# Patient Record
Sex: FEMALE | Race: WHITE | NOT HISPANIC OR LATINO | URBAN - METROPOLITAN AREA
[De-identification: names, ages, dates, MRNs, and addresses within clinical notes are randomized per-mention and may not be internally consistent; named-entity substitution may affect disease eponyms.]

---

## 2019-03-29 ENCOUNTER — EMERGENCY (EMERGENCY)
Facility: HOSPITAL | Age: 39
LOS: 1 days | End: 2019-03-29
Attending: EMERGENCY MEDICINE
Payer: COMMERCIAL

## 2019-03-29 VITALS
TEMPERATURE: 98 F | HEIGHT: 67 IN | SYSTOLIC BLOOD PRESSURE: 124 MMHG | HEART RATE: 111 BPM | OXYGEN SATURATION: 100 % | DIASTOLIC BLOOD PRESSURE: 83 MMHG | RESPIRATION RATE: 18 BRPM | WEIGHT: 160.06 LBS

## 2019-03-29 DIAGNOSIS — O03.9 COMPLETE OR UNSPECIFIED SPONTANEOUS ABORTION WITHOUT COMPLICATION: Chronic | ICD-10-CM

## 2019-03-29 LAB
ALBUMIN SERPL ELPH-MCNC: 4.4 G/DL — SIGNIFICANT CHANGE UP (ref 3.3–5)
ALP SERPL-CCNC: 99 U/L — SIGNIFICANT CHANGE UP (ref 40–120)
ALT FLD-CCNC: 185 U/L — HIGH (ref 10–45)
ANION GAP SERPL CALC-SCNC: 16 MMOL/L — SIGNIFICANT CHANGE UP (ref 5–17)
APPEARANCE UR: CLEAR — SIGNIFICANT CHANGE UP
AST SERPL-CCNC: 77 U/L — HIGH (ref 10–40)
BASOPHILS # BLD AUTO: 0 K/UL — SIGNIFICANT CHANGE UP (ref 0–0.2)
BASOPHILS NFR BLD AUTO: 0.1 % — SIGNIFICANT CHANGE UP (ref 0–2)
BILIRUB SERPL-MCNC: 0.4 MG/DL — SIGNIFICANT CHANGE UP (ref 0.2–1.2)
BILIRUB UR-MCNC: NEGATIVE — SIGNIFICANT CHANGE UP
BUN SERPL-MCNC: 8 MG/DL — SIGNIFICANT CHANGE UP (ref 7–23)
CALCIUM SERPL-MCNC: 9.6 MG/DL — SIGNIFICANT CHANGE UP (ref 8.4–10.5)
CHLORIDE SERPL-SCNC: 91 MMOL/L — LOW (ref 96–108)
CO2 SERPL-SCNC: 23 MMOL/L — SIGNIFICANT CHANGE UP (ref 22–31)
COLOR SPEC: COLORLESS — SIGNIFICANT CHANGE UP
CREAT SERPL-MCNC: 0.74 MG/DL — SIGNIFICANT CHANGE UP (ref 0.5–1.3)
DIFF PNL FLD: NEGATIVE — SIGNIFICANT CHANGE UP
EOSINOPHIL # BLD AUTO: 0 K/UL — SIGNIFICANT CHANGE UP (ref 0–0.5)
EOSINOPHIL NFR BLD AUTO: 0.2 % — SIGNIFICANT CHANGE UP (ref 0–6)
GAS PNL BLDV: SIGNIFICANT CHANGE UP
GAS PNL BLDV: SIGNIFICANT CHANGE UP
GLUCOSE SERPL-MCNC: 122 MG/DL — HIGH (ref 70–99)
GLUCOSE UR QL: NEGATIVE — SIGNIFICANT CHANGE UP
HCT VFR BLD CALC: 38.4 % — SIGNIFICANT CHANGE UP (ref 34.5–45)
HGB BLD-MCNC: 13 G/DL — SIGNIFICANT CHANGE UP (ref 11.5–15.5)
KETONES UR-MCNC: NEGATIVE — SIGNIFICANT CHANGE UP
LEUKOCYTE ESTERASE UR-ACNC: NEGATIVE — SIGNIFICANT CHANGE UP
LYMPHOCYTES # BLD AUTO: 1.5 K/UL — SIGNIFICANT CHANGE UP (ref 1–3.3)
LYMPHOCYTES # BLD AUTO: 7.4 % — LOW (ref 13–44)
MCHC RBC-ENTMCNC: 27.7 PG — SIGNIFICANT CHANGE UP (ref 27–34)
MCHC RBC-ENTMCNC: 33.9 GM/DL — SIGNIFICANT CHANGE UP (ref 32–36)
MCV RBC AUTO: 81.5 FL — SIGNIFICANT CHANGE UP (ref 80–100)
MONOCYTES # BLD AUTO: 1.5 K/UL — HIGH (ref 0–0.9)
MONOCYTES NFR BLD AUTO: 7.4 % — SIGNIFICANT CHANGE UP (ref 2–14)
NEUTROPHILS # BLD AUTO: 17.2 K/UL — HIGH (ref 1.8–7.4)
NEUTROPHILS NFR BLD AUTO: 84.9 % — HIGH (ref 43–77)
NITRITE UR-MCNC: NEGATIVE — SIGNIFICANT CHANGE UP
PH UR: 6.5 — SIGNIFICANT CHANGE UP (ref 5–8)
PLATELET # BLD AUTO: 251 K/UL — SIGNIFICANT CHANGE UP (ref 150–400)
POTASSIUM SERPL-MCNC: 3.7 MMOL/L — SIGNIFICANT CHANGE UP (ref 3.5–5.3)
POTASSIUM SERPL-SCNC: 3.7 MMOL/L — SIGNIFICANT CHANGE UP (ref 3.5–5.3)
PROT SERPL-MCNC: 8.1 G/DL — SIGNIFICANT CHANGE UP (ref 6–8.3)
PROT UR-MCNC: NEGATIVE — SIGNIFICANT CHANGE UP
RBC # BLD: 4.71 M/UL — SIGNIFICANT CHANGE UP (ref 3.8–5.2)
RBC # FLD: 14.7 % — HIGH (ref 10.3–14.5)
SODIUM SERPL-SCNC: 130 MMOL/L — LOW (ref 135–145)
SP GR SPEC: 1 — LOW (ref 1.01–1.02)
UROBILINOGEN FLD QL: NEGATIVE — SIGNIFICANT CHANGE UP
WBC # BLD: 20.3 K/UL — HIGH (ref 3.8–10.5)
WBC # FLD AUTO: 20.3 K/UL — HIGH (ref 3.8–10.5)

## 2019-03-29 PROCEDURE — 99220: CPT

## 2019-03-29 PROCEDURE — 74177 CT ABD & PELVIS W/CONTRAST: CPT | Mod: 26

## 2019-03-29 RX ORDER — CEFTRIAXONE 500 MG/1
1 INJECTION, POWDER, FOR SOLUTION INTRAMUSCULAR; INTRAVENOUS EVERY 12 HOURS
Qty: 0 | Refills: 0 | Status: DISCONTINUED | OUTPATIENT
Start: 2019-03-29 | End: 2019-04-02

## 2019-03-29 RX ORDER — KETOROLAC TROMETHAMINE 30 MG/ML
15 SYRINGE (ML) INJECTION ONCE
Qty: 0 | Refills: 0 | Status: DISCONTINUED | OUTPATIENT
Start: 2019-03-29 | End: 2019-03-29

## 2019-03-29 RX ORDER — SODIUM CHLORIDE 9 MG/ML
1000 INJECTION INTRAMUSCULAR; INTRAVENOUS; SUBCUTANEOUS ONCE
Qty: 0 | Refills: 0 | Status: COMPLETED | OUTPATIENT
Start: 2019-03-29 | End: 2019-03-29

## 2019-03-29 RX ORDER — KETOROLAC TROMETHAMINE 30 MG/ML
15 SYRINGE (ML) INJECTION EVERY 6 HOURS
Qty: 0 | Refills: 0 | Status: DISCONTINUED | OUTPATIENT
Start: 2019-03-29 | End: 2019-03-29

## 2019-03-29 RX ORDER — CEFOTETAN DISODIUM 1 G
2 VIAL (EA) INJECTION ONCE
Qty: 0 | Refills: 0 | Status: COMPLETED | OUTPATIENT
Start: 2019-03-29 | End: 2019-03-29

## 2019-03-29 RX ORDER — ACETAMINOPHEN 500 MG
975 TABLET ORAL ONCE
Qty: 0 | Refills: 0 | Status: DISCONTINUED | OUTPATIENT
Start: 2019-03-29 | End: 2019-03-29

## 2019-03-29 RX ADMIN — Medication 15 MILLIGRAM(S): at 22:29

## 2019-03-29 RX ADMIN — SODIUM CHLORIDE 2000 MILLILITER(S): 9 INJECTION INTRAMUSCULAR; INTRAVENOUS; SUBCUTANEOUS at 16:52

## 2019-03-29 RX ADMIN — Medication 15 MILLIGRAM(S): at 16:53

## 2019-03-29 RX ADMIN — Medication 100 GRAM(S): at 18:57

## 2019-03-29 RX ADMIN — SODIUM CHLORIDE 2000 MILLILITER(S): 9 INJECTION INTRAMUSCULAR; INTRAVENOUS; SUBCUTANEOUS at 18:57

## 2019-03-29 NOTE — ED ADULT NURSE NOTE - NSIMPLEMENTINTERV_GEN_ALL_ED
Implemented All Universal Safety Interventions:  [place] to call system. Call bell, personal items and telephone within reach. Instruct patient to call for assistance. Room bathroom lighting operational. Non-slip footwear when patient is off stretcher. Physically safe environment: no spills, clutter or unnecessary equipment. Stretcher in lowest position, wheels locked, appropriate side rails in place.

## 2019-03-29 NOTE — ED PROVIDER NOTE - PROGRESS NOTE DETAILS
Initially were pursuing CT no contrast to eval stone in setting of possible clinical pyelo, however urine unremarkable for infection or stone, so d/w attending who advised doing CTAP w/ oral contrast now to further evaluate. Pt aware and agreeable. Pt reports improved pain s/p toradol. - Xu Verdin PA-C Given WBC CT switched to CTAP w/ oral and IV. Zika panel (serum and urine) to be sent to lab per attending. Pt aware. - Xu Verdin PA-C Dr. Shen: Results of CTAP appreciated. Findings are most c/w pyleo given fever, leukocytosis, ureteral involvement. Low suspicion for infarct. Will dispo pt to CDU for IV hydration, IV abx, pain control. Given WBC CT switched to CTAP w/ oral and IV. Zika panel (serum and urine) to be sent to lab per attending. Pt aware. Pt does endorse she just finished short steroid taper course within the last 2 days for her migraine HA. attending aware. - Xu Verdin PA-C

## 2019-03-29 NOTE — ED PROVIDER NOTE - ATTENDING CONTRIBUTION TO CARE
38y F here with R flank pain and RLQ abd pain. Onset 2 days ago. 9/10 severity stabbing in quality. Today pain has improved slightly. No palliative or provocative factors. Pain was assoc with NV, none today. Pt had documented fever to Tmax 101F. No urinary sx. No diarrhea. Pt with recent travel to [place], home 10 days ago. Seen by GI Dr Bautista this AM and sent to ED for eval, no blood or urine.   Gen: WNWD uncomfortable appearing   HEENT: NCAT PERRL EOMI normal pharynx  Neck: supple  CV: RRR, no murmur  Lung: CTA BL  Abd: +BS soft ND TTP RLQ and R CVAT no grr  Ext: wwp, palp pulses, FROMx4, no cce  Neuro: CN grossly intact, sensation intact, motor 5/5 throughout  AP: 38y F here with R flank pain and RLQ abd pain x2 days assoc w fever and NV. Ddx includes but is not limited to: acute pyelo/UTI, appy, ovarian cyst, ectopic pregnancy. Will check labs, UA, CTAP. 38y F here with R flank pain and RLQ abd pain. Onset 2 days ago. 9/10 severity stabbing in quality. Today abd pain has improved slightly however back pain worsening. No provocative factors. Back pain feels better with pressure applied to area. Pain was assoc with NV, none today. Pt had documented fever to Tmax 101F. No urinary sx. No diarrhea. No vaginal DC. LMP last week. Pt with recent travel to [place], home 10 days ago. Seen by GI Dr Bautista this AM and sent to ED for eval, no blood or urine testing at office.   Gen: WNWD uncomfortable appearing   HEENT: NCAT PERRL EOMI normal pharynx dry mm  Neck: supple  CV: RRR, no murmur  Lung: CTA BL  Abd: +BS soft ND TTP RLQ and R CVAT no guarding, referred pain RLQ   Skin: warm dry intact, no rashes   Ext: wwp, palp pulses, FROMx4, no cce  Neuro: CN grossly intact, sensation intact, motor 5/5 throughout  AP: 38y F here with R flank pain and RLQ abd pain x2 days assoc w fever and NV. Ddx includes but is not limited to: acute pyelo/UTI, appy, ovarian cyst, ectopic pregnancy. Will check labs, UA, CTAP. 38y F here with R flank pain and RLQ abd pain. Onset 2 days ago. 9/10 severity stabbing in quality. Today abd pain has improved slightly however back pain worsening. No provocative factors. Back pain feels better with pressure applied to area. Pain was assoc with NV, none today. Pt had documented fever to Tmax 101F. No urinary sx. No diarrhea. No vaginal DC. LMP last week. Pt with recent travel to [place], home 10 days ago. Seen by GI Dr Bautista this AM and sent to ED for eval, no blood or urine testing at office. Of note pt was on steroids for possible sinusitis/migraine, last dose was 4 days ago.   Gen: WNWD uncomfortable appearing   HEENT: NCAT PERRL EOMI normal pharynx dry mm  Neck: supple  CV: RRR, no murmur  Lung: CTA BL  Abd: +BS soft ND TTP RLQ and R CVAT no guarding, referred pain RLQ   Skin: warm dry intact, no rashes   Ext: wwp, palp pulses, FROMx4, no cce  Neuro: CN grossly intact, sensation intact, motor 5/5 throughout  AP: 38y F here with R flank pain and RLQ abd pain x2 days assoc w fever and NV. Ddx includes but is not limited to: acute pyelo/UTI, appy, ovarian cyst, ectopic pregnancy. Will check labs, UA, CTAP.

## 2019-03-29 NOTE — ED PROVIDER NOTE - MUSCULOSKELETAL, MLM
+right CVA tenderness. No evidence of rash. Spine appears normal, range of motion is not limited, no muscle or joint tenderness

## 2019-03-29 NOTE — ED PROVIDER NOTE - OBJECTIVE STATEMENT
37 yo female PMHx PCOS, 37 yo female PMHx PCOS, migraine headaches presents to the ED c/o right sided flank pain and right lower quadrant abdominal pain x 2 days. Pt states symptoms initially started with lower abdominal pain when she was on table for MRI (outpatient neuro workup for migraine headaches), pain was initially 5/10, stabbing in quality no worse w/ movement. Since then, abdominal pain has improved and she's began to develop more R flank pain with fever/chills (tmax 101 yesterday), additionally had nausea and several episodes of nb/nb vomiting. Mild decreased appetite. Recently returned from [facility] 10 days. Went to GI Dr. Lott this AM regarding symptoms, sent pt to ED. Denies recent sick contacts, urinary urgency, dysuria, frequency, chest pain, shortness of breath, hematuria, vaginal bleeding/discharge, diarrhea, dizziness/light-headedness, numbness/tingling, rash, bite wounds, joint pain. 39 yo female PMHx PCOS, migraine headaches presents to the ED c/o right sided flank pain and right lower quadrant abdominal pain x 2 days. Pt states symptoms initially started with lower abdominal pain when she was on table for MRI (outpatient neuro workup for migraine headaches,), pain was initially 5/10, stabbing in quality no worse w/ movement. Since then, abdominal pain has improved and she's began to develop more R flank pain with fever/chills (tmax 101 yesterday), additionally had nausea and several episodes of nb/nb vomiting. Mild decreased appetite. Recently returned from [place] 10 days. Of note pt just finished steroid taper for her migraine headaches prescribed by her neurologist. Went to GI Dr. Lott this AM regarding symptoms, sent pt to ED. Denies recent sick contacts, urinary urgency, dysuria, frequency, chest pain, shortness of breath, hematuria, vaginal bleeding/discharge, diarrhea, dizziness/light-headedness, numbness/tingling, rash, bite wounds, joint pain.

## 2019-03-29 NOTE — ED ADULT NURSE NOTE - OBJECTIVE STATEMENT
38 yr old female (hx PCOS on metformin, migraine, fetal demise x 2 yrs ago, D&E, post partem pre eclampsia treated with metoprolol) sent by gastro for eval of R flank pain, c/o R flank/back pain x 2 days, nausea, vomited twice over last 2 days, temp: 100.1 *F, returned from [place] on march 18, since return home, pt c/o photosensitivity, migraine, has had MRI head, completed dose of steroids, now headache resolved, +persistent photosensitivity, at present +R CVA tenderness, abd soft nontender, clear lungs, LMP:  2 days ago, denies hematuria, +also admits to constipation  x2 days, "small bowel movement yesterday", multiple family members present

## 2019-03-29 NOTE — ED PROVIDER NOTE - ABDOMINAL TENDER
Abdomen is soft, ttp RLQ without rebound, guarding or rigidity. +Rovsing's sign and ttp over McBurney's point. Negative Salas's. +right CVA tenderness./right lower quadrant/right costovertebral angle

## 2019-03-30 VITALS
OXYGEN SATURATION: 98 % | DIASTOLIC BLOOD PRESSURE: 79 MMHG | SYSTOLIC BLOOD PRESSURE: 120 MMHG | RESPIRATION RATE: 18 BRPM | HEART RATE: 88 BPM | TEMPERATURE: 99 F

## 2019-03-30 LAB
ALBUMIN SERPL ELPH-MCNC: 3.3 G/DL — SIGNIFICANT CHANGE UP (ref 3.3–5)
ALP SERPL-CCNC: 79 U/L — SIGNIFICANT CHANGE UP (ref 40–120)
ALT FLD-CCNC: 108 U/L — HIGH (ref 10–45)
ANION GAP SERPL CALC-SCNC: 11 MMOL/L — SIGNIFICANT CHANGE UP (ref 5–17)
AST SERPL-CCNC: 37 U/L — SIGNIFICANT CHANGE UP (ref 10–40)
BASOPHILS # BLD AUTO: 0 K/UL — SIGNIFICANT CHANGE UP (ref 0–0.2)
BASOPHILS NFR BLD AUTO: 0.1 % — SIGNIFICANT CHANGE UP (ref 0–2)
BILIRUB SERPL-MCNC: 0.3 MG/DL — SIGNIFICANT CHANGE UP (ref 0.2–1.2)
BUN SERPL-MCNC: 7 MG/DL — SIGNIFICANT CHANGE UP (ref 7–23)
CALCIUM SERPL-MCNC: 8.5 MG/DL — SIGNIFICANT CHANGE UP (ref 8.4–10.5)
CHLORIDE SERPL-SCNC: 105 MMOL/L — SIGNIFICANT CHANGE UP (ref 96–108)
CO2 SERPL-SCNC: 23 MMOL/L — SIGNIFICANT CHANGE UP (ref 22–31)
CREAT SERPL-MCNC: 0.88 MG/DL — SIGNIFICANT CHANGE UP (ref 0.5–1.3)
CULTURE RESULTS: SIGNIFICANT CHANGE UP
EOSINOPHIL # BLD AUTO: 0 K/UL — SIGNIFICANT CHANGE UP (ref 0–0.5)
EOSINOPHIL NFR BLD AUTO: 0.3 % — SIGNIFICANT CHANGE UP (ref 0–6)
GLUCOSE SERPL-MCNC: 101 MG/DL — HIGH (ref 70–99)
HCT VFR BLD CALC: 31 % — LOW (ref 34.5–45)
HGB BLD-MCNC: 10.2 G/DL — LOW (ref 11.5–15.5)
LYMPHOCYTES # BLD AUTO: 1.3 K/UL — SIGNIFICANT CHANGE UP (ref 1–3.3)
LYMPHOCYTES # BLD AUTO: 9.5 % — LOW (ref 13–44)
MCHC RBC-ENTMCNC: 26.9 PG — LOW (ref 27–34)
MCHC RBC-ENTMCNC: 33 GM/DL — SIGNIFICANT CHANGE UP (ref 32–36)
MCV RBC AUTO: 81.6 FL — SIGNIFICANT CHANGE UP (ref 80–100)
MONOCYTES # BLD AUTO: 1.2 K/UL — HIGH (ref 0–0.9)
MONOCYTES NFR BLD AUTO: 9 % — SIGNIFICANT CHANGE UP (ref 2–14)
NEUTROPHILS # BLD AUTO: 11.1 K/UL — HIGH (ref 1.8–7.4)
NEUTROPHILS NFR BLD AUTO: 81.1 % — HIGH (ref 43–77)
PLATELET # BLD AUTO: 199 K/UL — SIGNIFICANT CHANGE UP (ref 150–400)
POTASSIUM SERPL-MCNC: 4 MMOL/L — SIGNIFICANT CHANGE UP (ref 3.5–5.3)
POTASSIUM SERPL-SCNC: 4 MMOL/L — SIGNIFICANT CHANGE UP (ref 3.5–5.3)
PROT SERPL-MCNC: 6.1 G/DL — SIGNIFICANT CHANGE UP (ref 6–8.3)
RBC # BLD: 3.8 M/UL — SIGNIFICANT CHANGE UP (ref 3.8–5.2)
RBC # FLD: 14.6 % — HIGH (ref 10.3–14.5)
SODIUM SERPL-SCNC: 139 MMOL/L — SIGNIFICANT CHANGE UP (ref 135–145)
SPECIMEN SOURCE: SIGNIFICANT CHANGE UP
WBC # BLD: 13.7 K/UL — HIGH (ref 3.8–10.5)
WBC # FLD AUTO: 13.7 K/UL — HIGH (ref 3.8–10.5)

## 2019-03-30 PROCEDURE — 87662 ZIKA VIRUS DNA/RNA AMP PROBE: CPT

## 2019-03-30 PROCEDURE — 85027 COMPLETE CBC AUTOMATED: CPT

## 2019-03-30 PROCEDURE — 82435 ASSAY OF BLOOD CHLORIDE: CPT

## 2019-03-30 PROCEDURE — 82330 ASSAY OF CALCIUM: CPT

## 2019-03-30 PROCEDURE — 96375 TX/PRO/DX INJ NEW DRUG ADDON: CPT

## 2019-03-30 PROCEDURE — 82803 BLOOD GASES ANY COMBINATION: CPT

## 2019-03-30 PROCEDURE — 83605 ASSAY OF LACTIC ACID: CPT

## 2019-03-30 PROCEDURE — 80053 COMPREHEN METABOLIC PANEL: CPT

## 2019-03-30 PROCEDURE — 81001 URINALYSIS AUTO W/SCOPE: CPT

## 2019-03-30 PROCEDURE — 82947 ASSAY GLUCOSE BLOOD QUANT: CPT

## 2019-03-30 PROCEDURE — 87086 URINE CULTURE/COLONY COUNT: CPT

## 2019-03-30 PROCEDURE — 74177 CT ABD & PELVIS W/CONTRAST: CPT

## 2019-03-30 PROCEDURE — 99284 EMERGENCY DEPT VISIT MOD MDM: CPT | Mod: 25

## 2019-03-30 PROCEDURE — 96374 THER/PROPH/DIAG INJ IV PUSH: CPT | Mod: XU

## 2019-03-30 PROCEDURE — 84295 ASSAY OF SERUM SODIUM: CPT

## 2019-03-30 PROCEDURE — 85014 HEMATOCRIT: CPT

## 2019-03-30 PROCEDURE — 84132 ASSAY OF SERUM POTASSIUM: CPT

## 2019-03-30 PROCEDURE — 96376 TX/PRO/DX INJ SAME DRUG ADON: CPT

## 2019-03-30 PROCEDURE — G0378: CPT

## 2019-03-30 PROCEDURE — 99217: CPT

## 2019-03-30 RX ORDER — SODIUM CHLORIDE 9 MG/ML
1000 INJECTION INTRAMUSCULAR; INTRAVENOUS; SUBCUTANEOUS ONCE
Qty: 0 | Refills: 0 | Status: COMPLETED | OUTPATIENT
Start: 2019-03-30 | End: 2019-03-30

## 2019-03-30 RX ORDER — ACETAMINOPHEN 500 MG
975 TABLET ORAL ONCE
Qty: 0 | Refills: 0 | Status: COMPLETED | OUTPATIENT
Start: 2019-03-30 | End: 2019-03-30

## 2019-03-30 RX ORDER — FLUCONAZOLE 150 MG/1
1 TABLET ORAL
Qty: 1 | Refills: 0
Start: 2019-03-30

## 2019-03-30 RX ORDER — CEFPODOXIME PROXETIL 100 MG
1 TABLET ORAL
Qty: 20 | Refills: 0
Start: 2019-03-30 | End: 2019-04-08

## 2019-03-30 RX ADMIN — CEFTRIAXONE 100 GRAM(S): 500 INJECTION, POWDER, FOR SOLUTION INTRAMUSCULAR; INTRAVENOUS at 06:14

## 2019-03-30 RX ADMIN — Medication 975 MILLIGRAM(S): at 04:33

## 2019-03-30 RX ADMIN — SODIUM CHLORIDE 1000 MILLILITER(S): 9 INJECTION INTRAMUSCULAR; INTRAVENOUS; SUBCUTANEOUS at 04:33

## 2019-03-30 RX ADMIN — Medication 975 MILLIGRAM(S): at 05:12

## 2019-03-30 NOTE — ED CDU PROVIDER SUBSEQUENT DAY NOTE - MEDICAL DECISION MAKING DETAILS
Pt with flank pain and fever no vomiting, abdomen soft non tender no masses , IV antibiotcs re eval --Jimenez improving in CDU

## 2019-03-30 NOTE — ED ADULT NURSE REASSESSMENT NOTE - NS ED NURSE REASSESS COMMENT FT1
15.30  pt is reviewed by Dr Tony SANCHEZ MD pt is discharged  . ML out BOO Hardy explained the follow up care & gave the discharge summary. Pt verbalized the understanding on follow up care pt stable to go home  Pt has stable vitals steady gait at the time of discharge
Patient report received from dayshift RN. Patient is a/ox3, reports improvement in back/abdomen pain, 3/10. Patient pending CT at this time. No acute distress, no n/v/d. Patient well appearing, VSS, afebrile. Family at bedside.
drinking CT contrast, family members present
Pt received from PABLO Rose. Pt oriented to CDU & plan of care was discussed. Pt states she has 4/10 pain to R flank and no R abdominal pain unless palpated. Pt does not want any pain meds at this time. Pt denies any urinary symptoms. Safety & comfort measures maintained. Call bell in reach. Will continue to monitor.
07.00 Am  Pt received from PABLO Banerjee. Pt is observed for   Pyelonephritis   08.00Am Pt is reassessed Pt is AO4. Pt denies N/V/D fever chills CP SOB headache dizziness  .pt is pain free Safety & comfort measures maintained. Call bell in reach. IV site looks clean & dry & no signs of infiltration noted.  Will continue to monitor

## 2019-03-30 NOTE — ED CDU PROVIDER INITIAL DAY NOTE - ATTENDING CONTRIBUTION TO CARE
38y F hx of PCOS, recent dx of migraine HAs presented to ED with c/o R sided lower abd pain and R flank pain x2 days assoc with nausea, vomiting, fever, chills. Denies urinary sx. Was recently on steroids. + Recent travel to [facility]. Labs in ED show leukocytosis, UA unremarkable, however CT shows areas of decreased attenuation in the right kidney lower pole with significant perinephric inflammation. Nonspecific urothelial enhancement of the right ureter. Findings suspicious for acute pyelonephritis, ucx pending, pt initiated on IV abx. Dispo to CDU for IV abx, fever/pain control and monitoring.

## 2019-03-30 NOTE — ED CDU PROVIDER SUBSEQUENT DAY NOTE - HISTORY
No interval changes since initial CDU provider note. Pt feels well without complaint. states abd/flank pain are improving. NAD, VSS. will continue IV ceftriaxone and monitoring. Bobby Titus

## 2019-03-30 NOTE — ED CDU PROVIDER DISPOSITION NOTE - ATTENDING CONTRIBUTION TO CARE
I have seen and evaluated this patient with the [place] practice clinician.   I agree with the findings  unless other wise stated. I have amended notes where needed.  After my face to face bedside evaluation, I am noting: patient responded well to ceftriaxone.  Flank pain improved.  Patient remained afebrile throughout the day.  Patient medically stable for discharge home with cefpodoxime and close outpatient follow up.

## 2019-03-30 NOTE — ED CDU PROVIDER DISPOSITION NOTE - NSFOLLOWUPINSTRUCTIONS_ED_ALL_ED_FT
1. Stay well hydrated  2.  Take cefpodoxime as prescribed.   Take Motrin 600mg every 6-8 hours with food if needed for pain. Take Tylenol 650mg every 6 hours as needed for fever/pain.   2.  Follow up with your PMD within 48-72 hours.  3. Worsening pain, persistent fevers, chills, nausea, vomiting return to ER

## 2019-03-30 NOTE — ED CDU PROVIDER DISPOSITION NOTE - PLAN OF CARE
1. Take _________ as prescribed.  Increase fluids. Take Motrin 600mg every 8 hours with food if needed for pain. Take Tylenol 650mg every 6-8 hours as needed for fever/pain.   2.  Follow up with your PMD within 48-72 hours.  3. Worsening pain, new fever, chills, nausea, vomiting return to ER 1. Stay well hydrated  2.  Take cefpodoxime as prescribed.   Take Motrin 600mg every 6-8 hours with food if needed for pain. Take Tylenol 650mg every 6 hours as needed for fever/pain.   2.  Follow up with your PMD within 48-72 hours.  3. Worsening pain, persistent fevers, chills, nausea, vomiting return to ER

## 2019-03-30 NOTE — ED CDU PROVIDER SUBSEQUENT DAY NOTE - ATTENDING CONTRIBUTION TO CARE
I have seen and evaluated this patient with the advance practice clinician.   I agree with the findings  unless other wise stated. I have amended notes where needed.  After my face to face bedside evaluation, I am noting: Pt with flank pain and fever no vomiting, abdomen soft non tender no masses , IV antibiotcs re eval --Jimenez improving in CDU

## 2019-03-30 NOTE — ED CDU PROVIDER SUBSEQUENT DAY NOTE - PROGRESS NOTE DETAILS
CDU NOTE BOO NASH: Pt resting comfortably, feeling well; states abd/flank pain decreased to 2/10. NAD, VSS aside from temp 101.8. abd: flat/ND/NT/soft, + R CVAT. will give IVF and tylenol for fever. will continue monitoring. Patient seen at bedside in NAD.  VSS.  Patient resting comfortably without complaints. Patient reports that her pain has improved.  Last fever 101.8 at 430am.  Currently afebrile.  -Gadiel Hardy PA-C Patient seen at bedside in NAD.  VSS.  Patient resting comfortably without complaints. Patient continues to feel better.  No fevers/chills. Right flank pain has significantly improved.  Tolerating PO.  Patient given strict return precautions and close outpatient follow up encouraged.  -Gadiel Hardy PA-C

## 2019-03-30 NOTE — ED CDU PROVIDER INITIAL DAY NOTE - OBJECTIVE STATEMENT
37 y/o female with PMHx PCOS and migraine headaches presents to the ED c/o right sided flank pain and right lower quadrant abdominal pain x 2 days. Pt states symptoms initially started with lower abdominal pain when she was on table for MRI (outpatient neuro workup for migraine headaches,), pain was initially 5/10, stabbing in quality no worse w/ movement. Since then, abdominal pain has improved but she developed more R flank pain with fever/chills (tmax 100.1 today), additionally had nausea and 2 episodes of nb/nb vomiting yesterday. Mild decreased appetite. Recently returned from [place] 10 days. Of note pt just finished steroid taper for dizziness 2/2 presumed inner ear viral infx by her ENT. Went to GI Dr. Lott this AM regarding symptoms, sent pt to ED. Denies recent sick contacts, urinary urgency, dysuria, frequency, chest pain, shortness of breath, hematuria, vaginal bleeding/discharge, diarrhea, dizziness/light-headedness, numbness/tingling, rash, bite wounds, joint pain.  In ED, WBC 20.3, Na 130, AST 77, , lactate 3.1, UA negative, CT a/p showed decreased attenuation of R renal cortex with perinephric stranding. pt given IVFs, repeat lactate 1.0. pt started on IV cefotetan and sent to CDU for continued IV antibiotics (ceftriaxone).     PMD Dr. Denise Milton (not aff.)

## 2019-04-01 LAB
ZIKA VIRUS PCR SERUM: SIGNIFICANT CHANGE UP
ZIKA VIRUS PCR URINE: SIGNIFICANT CHANGE UP
ZIKV RNA SERPL QL NAA+PROBE: SIGNIFICANT CHANGE UP
ZIKV RNA UR QL NAA+PROBE: SIGNIFICANT CHANGE UP

## 2020-10-09 PROBLEM — G43.909 MIGRAINE, UNSPECIFIED, NOT INTRACTABLE, WITHOUT STATUS MIGRAINOSUS: Chronic | Status: ACTIVE | Noted: 2019-03-29

## 2020-10-09 PROBLEM — E28.2 POLYCYSTIC OVARIAN SYNDROME: Chronic | Status: ACTIVE | Noted: 2019-03-29

## 2020-11-03 ENCOUNTER — APPOINTMENT (OUTPATIENT)
Dept: INTERNAL MEDICINE | Facility: CLINIC | Age: 40
End: 2020-11-03
Payer: COMMERCIAL

## 2020-11-03 VITALS
OXYGEN SATURATION: 100 % | HEART RATE: 99 BPM | DIASTOLIC BLOOD PRESSURE: 82 MMHG | HEIGHT: 66 IN | WEIGHT: 176 LBS | BODY MASS INDEX: 28.28 KG/M2 | SYSTOLIC BLOOD PRESSURE: 126 MMHG | TEMPERATURE: 97.52 F

## 2020-11-03 DIAGNOSIS — Z82.49 FAMILY HISTORY OF ISCHEMIC HEART DISEASE AND OTHER DISEASES OF THE CIRCULATORY SYSTEM: ICD-10-CM

## 2020-11-03 DIAGNOSIS — E28.2 POLYCYSTIC OVARIAN SYNDROME: ICD-10-CM

## 2020-11-03 DIAGNOSIS — Z86.69 PERSONAL HISTORY OF OTHER DISEASES OF THE NERVOUS SYSTEM AND SENSE ORGANS: ICD-10-CM

## 2020-11-03 PROCEDURE — G0444 DEPRESSION SCREEN ANNUAL: CPT

## 2020-11-03 PROCEDURE — 99072 ADDL SUPL MATRL&STAF TM PHE: CPT

## 2020-11-03 PROCEDURE — 99385 PREV VISIT NEW AGE 18-39: CPT | Mod: 25

## 2020-11-03 NOTE — HISTORY OF PRESENT ILLNESS
[FreeTextEntry1] : Annual Physical [de-identified] : SINGH MCCORD is a 41 yo woman with PCOS, anxiety here for a physical. She has been well overall.  She has seen KRUNAL Haynes in the past. Anxiety stable with no meds.  Doing well overall\par \par Gyn, derm due. Tdap and flu are utd\par \par The patient has a history of pregnancy loss at 23 weeks. She underwent a D and E.  She was subsequently hospitalized with hypertension.  She was on metoprolol.  BP has been stable off of meds\par \par The patient is  with one adopted son.  She works in [employer].  She would have no difficulty walking 4 to 6 blocks or 2 flights of stairs.

## 2020-11-03 NOTE — ASSESSMENT
[FreeTextEntry1] : HCM\par Labs ordered\par Advised gyn\par Advised derm\par Tdap and flu utd\par f/u prn or 1 year

## 2020-11-03 NOTE — HEALTH RISK ASSESSMENT
[Good] : ~his/her~  mood as  good [Yes] : Yes [Monthly or less (1 pt)] : Monthly or less (1 point) [No] : In the past 12 months have you used drugs other than those required for medical reasons? No [No falls in past year] : Patient reported no falls in the past year [None] : None [With Family] : lives with family [Employed] : employed [Feels Safe at Home] : Feels safe at home [Fully functional (bathing, dressing, toileting, transferring, walking, feeding)] : Fully functional (bathing, dressing, toileting, transferring, walking, feeding) [Fully functional (using the telephone, shopping, preparing meals, housekeeping, doing laundry, using] : Fully functional and needs no help or supervision to perform IADLs (using the telephone, shopping, preparing meals, housekeeping, doing laundry, using transportation, managing medications and managing finances) [Smoke Detector] : smoke detector [Carbon Monoxide Detector] : carbon monoxide detector [Seat Belt] :  uses seat belt [] : No [de-identified] : active [de-identified] : regular [Reports changes in hearing] : Reports no changes in hearing [Reports changes in vision] : Reports no changes in vision [Reports changes in dental health] : Reports no changes in dental health

## 2020-11-06 ENCOUNTER — LABORATORY RESULT (OUTPATIENT)
Age: 40
End: 2020-11-06

## 2020-12-11 ENCOUNTER — LABORATORY RESULT (OUTPATIENT)
Age: 40
End: 2020-12-11

## 2020-12-11 ENCOUNTER — APPOINTMENT (OUTPATIENT)
Dept: INTERNAL MEDICINE | Facility: CLINIC | Age: 40
End: 2020-12-11
Payer: COMMERCIAL

## 2020-12-11 PROCEDURE — 99072 ADDL SUPL MATRL&STAF TM PHE: CPT

## 2020-12-11 PROCEDURE — 36415 COLL VENOUS BLD VENIPUNCTURE: CPT

## 2020-12-15 ENCOUNTER — TRANSCRIPTION ENCOUNTER (OUTPATIENT)
Age: 40
End: 2020-12-15

## 2020-12-15 LAB
25(OH)D3 SERPL-MCNC: 33.2 NG/ML
ALBUMIN SERPL ELPH-MCNC: 4.7 G/DL
ALP BLD-CCNC: 76 U/L
ALT SERPL-CCNC: 7 U/L
ANION GAP SERPL CALC-SCNC: 11 MMOL/L
APPEARANCE: CLEAR
AST SERPL-CCNC: 15 U/L
BASOPHILS # BLD AUTO: 0.03 K/UL
BASOPHILS # BLD AUTO: 0.04 K/UL
BASOPHILS NFR BLD AUTO: 0.4 %
BASOPHILS NFR BLD AUTO: 0.6 %
BILIRUB SERPL-MCNC: 0.2 MG/DL
BILIRUBIN URINE: NEGATIVE
BLOOD URINE: ABNORMAL
BUN SERPL-MCNC: 12 MG/DL
CALCIUM SERPL-MCNC: 9.9 MG/DL
CHLORIDE SERPL-SCNC: 102 MMOL/L
CHOLEST SERPL-MCNC: 178 MG/DL
CO2 SERPL-SCNC: 25 MMOL/L
COLOR: COLORLESS
CREAT SERPL-MCNC: 0.77 MG/DL
EOSINOPHIL # BLD AUTO: 0.13 K/UL
EOSINOPHIL # BLD AUTO: 0.13 K/UL
EOSINOPHIL NFR BLD AUTO: 1.8 %
EOSINOPHIL NFR BLD AUTO: 1.8 %
ESTIMATED AVERAGE GLUCOSE: 97 MG/DL
GLUCOSE QUALITATIVE U: NEGATIVE
GLUCOSE SERPL-MCNC: 84 MG/DL
HBA1C MFR BLD HPLC: 5 %
HCT VFR BLD CALC: 35.9 %
HCT VFR BLD CALC: 36.1 %
HDLC SERPL-MCNC: 70 MG/DL
HGB BLD-MCNC: 10.6 G/DL
HGB BLD-MCNC: 10.6 G/DL
IMM GRANULOCYTES NFR BLD AUTO: 0.3 %
IMM GRANULOCYTES NFR BLD AUTO: 0.4 %
KETONES URINE: NEGATIVE
LDLC SERPL CALC-MCNC: 92 MG/DL
LEUKOCYTE ESTERASE URINE: NEGATIVE
LYMPHOCYTES # BLD AUTO: 1.95 K/UL
LYMPHOCYTES # BLD AUTO: 2.02 K/UL
LYMPHOCYTES NFR BLD AUTO: 27.7 %
LYMPHOCYTES NFR BLD AUTO: 28.6 %
MAN DIFF?: NORMAL
MAN DIFF?: NORMAL
MCHC RBC-ENTMCNC: 23.2 PG
MCHC RBC-ENTMCNC: 23.2 PG
MCHC RBC-ENTMCNC: 29.4 GM/DL
MCHC RBC-ENTMCNC: 29.5 GM/DL
MCV RBC AUTO: 78.7 FL
MCV RBC AUTO: 79.2 FL
MONOCYTES # BLD AUTO: 0.42 K/UL
MONOCYTES # BLD AUTO: 0.44 K/UL
MONOCYTES NFR BLD AUTO: 6 %
MONOCYTES NFR BLD AUTO: 6.2 %
NEUTROPHILS # BLD AUTO: 4.4 K/UL
NEUTROPHILS # BLD AUTO: 4.5 K/UL
NEUTROPHILS NFR BLD AUTO: 62.4 %
NEUTROPHILS NFR BLD AUTO: 63.8 %
NITRITE URINE: NEGATIVE
NONHDLC SERPL-MCNC: 107 MG/DL
PH URINE: 7
PLATELET # BLD AUTO: 370 K/UL
PLATELET # BLD AUTO: 373 K/UL
POTASSIUM SERPL-SCNC: 4.2 MMOL/L
PROT SERPL-MCNC: 7.2 G/DL
PROTEIN URINE: NEGATIVE
RBC # BLD: 4.56 M/UL
RBC # BLD: 4.56 M/UL
RBC # FLD: 16.5 %
RBC # FLD: 16.7 %
SARS-COV-2 IGG SERPL IA-ACNC: 0.06 INDEX
SARS-COV-2 IGG SERPL QL IA: NEGATIVE
SODIUM SERPL-SCNC: 139 MMOL/L
SPECIFIC GRAVITY URINE: 1.01
T4 FREE SERPL-MCNC: 1.3 NG/DL
TRIGL SERPL-MCNC: 76 MG/DL
TSH SERPL-ACNC: 1.26 UIU/ML
UROBILINOGEN URINE: NORMAL
VIT B12 SERPL-MCNC: 1526 PG/ML
WBC # FLD AUTO: 7.05 K/UL
WBC # FLD AUTO: 7.06 K/UL

## 2020-12-29 ENCOUNTER — APPOINTMENT (OUTPATIENT)
Dept: DERMATOLOGY | Facility: CLINIC | Age: 40
End: 2020-12-29
Payer: COMMERCIAL

## 2020-12-29 VITALS — BODY MASS INDEX: 26.68 KG/M2 | WEIGHT: 170 LBS | HEIGHT: 67 IN

## 2020-12-29 DIAGNOSIS — L30.9 DERMATITIS, UNSPECIFIED: ICD-10-CM

## 2020-12-29 DIAGNOSIS — D22.9 MELANOCYTIC NEVI, UNSPECIFIED: ICD-10-CM

## 2020-12-29 DIAGNOSIS — Z12.83 ENCOUNTER FOR SCREENING FOR MALIGNANT NEOPLASM OF SKIN: ICD-10-CM

## 2020-12-29 DIAGNOSIS — D23.9 OTHER BENIGN NEOPLASM OF SKIN, UNSPECIFIED: ICD-10-CM

## 2020-12-29 PROCEDURE — 99072 ADDL SUPL MATRL&STAF TM PHE: CPT

## 2020-12-29 PROCEDURE — 99203 OFFICE O/P NEW LOW 30 MIN: CPT

## 2021-03-26 ENCOUNTER — APPOINTMENT (OUTPATIENT)
Dept: OBGYN | Facility: CLINIC | Age: 41
End: 2021-03-26
Payer: COMMERCIAL

## 2021-03-26 VITALS
HEIGHT: 67 IN | WEIGHT: 173 LBS | SYSTOLIC BLOOD PRESSURE: 154 MMHG | BODY MASS INDEX: 27.15 KG/M2 | TEMPERATURE: 206.78 F | DIASTOLIC BLOOD PRESSURE: 91 MMHG | HEART RATE: 94 BPM

## 2021-03-26 VITALS — SYSTOLIC BLOOD PRESSURE: 142 MMHG | HEART RATE: 87 BPM | DIASTOLIC BLOOD PRESSURE: 94 MMHG

## 2021-03-26 VITALS — SYSTOLIC BLOOD PRESSURE: 119 MMHG | DIASTOLIC BLOOD PRESSURE: 80 MMHG | HEART RATE: 89 BPM

## 2021-03-26 DIAGNOSIS — Z01.419 ENCOUNTER FOR GYNECOLOGICAL EXAMINATION (GENERAL) (ROUTINE) W/OUT ABNORMAL FINDINGS: ICD-10-CM

## 2021-03-26 PROCEDURE — 99072 ADDL SUPL MATRL&STAF TM PHE: CPT

## 2021-03-26 PROCEDURE — 99385 PREV VISIT NEW AGE 18-39: CPT

## 2021-04-20 ENCOUNTER — NON-APPOINTMENT (OUTPATIENT)
Age: 41
End: 2021-04-20

## 2021-04-27 ENCOUNTER — TRANSCRIPTION ENCOUNTER (OUTPATIENT)
Age: 41
End: 2021-04-27

## 2021-04-28 LAB
CYTOLOGY CVX/VAG DOC THIN PREP: NORMAL
HPV HIGH+LOW RISK DNA PNL CVX: NOT DETECTED

## 2021-05-05 ENCOUNTER — APPOINTMENT (OUTPATIENT)
Dept: OBGYN | Facility: CLINIC | Age: 41
End: 2021-05-05
Payer: COMMERCIAL

## 2021-05-05 ENCOUNTER — ASOB RESULT (OUTPATIENT)
Age: 41
End: 2021-05-05

## 2021-05-05 VITALS
HEIGHT: 55 IN | WEIGHT: 174 LBS | SYSTOLIC BLOOD PRESSURE: 139 MMHG | DIASTOLIC BLOOD PRESSURE: 84 MMHG | BODY MASS INDEX: 40.27 KG/M2 | TEMPERATURE: 97.52 F

## 2021-05-05 DIAGNOSIS — Z30.430 ENCOUNTER FOR INSERTION OF INTRAUTERINE CONTRACEPTIVE DEVICE: ICD-10-CM

## 2021-05-05 LAB
HCG UR QL: NEGATIVE
QUALITY CONTROL: YES

## 2021-05-05 PROCEDURE — 76830 TRANSVAGINAL US NON-OB: CPT

## 2021-05-05 PROCEDURE — 58300 INSERT INTRAUTERINE DEVICE: CPT

## 2021-05-05 PROCEDURE — 99072 ADDL SUPL MATRL&STAF TM PHE: CPT

## 2021-05-14 ENCOUNTER — NON-APPOINTMENT (OUTPATIENT)
Age: 41
End: 2021-05-14

## 2021-06-24 ENCOUNTER — TRANSCRIPTION ENCOUNTER (OUTPATIENT)
Age: 41
End: 2021-06-24

## 2021-06-24 NOTE — HISTORY OF PRESENT ILLNESS
[FreeTextEntry1] : 39 y/o F with prolonged cycles and PCOS here for annual visit and to discuss hormonal options for cycle control. does not desire children. Previously had 23 week still birth complicated by severe preeclampsia. Now has son through adoption

## 2021-06-24 NOTE — DISCUSSION/SUMMARY
[FreeTextEntry1] : Discussed polycystic ovarian syndrome as constellation of symptoms with risks for endometrial hyperplasia, endometrial cancer, hyperlipidemia, diabetes, obesity and infertility. Discussed treatment including weight loss and hormonal control. Options such as combined estrogen-progestin discussed for cyclic endometrial shedding, Progestin only hormonal control such as Depo-Provera and Levonorgestrel -IUD, and cyclic Provera  discussed. Discussed glycemic control with weight loss, nutrition and metformin. Patient opts for IUD placement which will be scheduled.\par

## 2021-06-25 NOTE — PROCEDURE
[IUD Placement] : intrauterine device (IUD) placement [Time out performed] : Pre-procedure time out performed.  Patient's name, date of birth and procedure confirmed. [Consent Obtained] : Consent obtained [Risks] : risks [Benefits] : benefits [Alternatives] : alternatives [Patient] : patient [Infection] : infection [Bleeding] : bleeding [Pain] : pain [Expulsion] : expulsion [Failure] : failure [Uterine Perforation] : uterine perforation [Neg Pregnancy Test] : negative pregnancy test [History of Unprotected [place]] : no history of unprotected intercourse [No Premedication] : No premedication [Betadine] : Betadine [Tenaculum] : Tenaculum [Easy Passage] : Easy passage [Mirena IUD] : Mirena IUD [Tolerated Well] : Patient tolerated the procedure well [No Complications] : No complications [None] : None [de-identified] : performed under ultrasound guidance

## 2021-07-28 LAB
C TRACH RRNA SPEC QL NAA+PROBE: NOT DETECTED
N GONORRHOEA RRNA SPEC QL NAA+PROBE: NOT DETECTED
SOURCE AMPLIFICATION: NORMAL

## 2021-09-02 ENCOUNTER — APPOINTMENT (OUTPATIENT)
Dept: GASTROENTEROLOGY | Facility: CLINIC | Age: 41
End: 2021-09-02
Payer: COMMERCIAL

## 2021-09-02 VITALS
DIASTOLIC BLOOD PRESSURE: 80 MMHG | HEART RATE: 100 BPM | OXYGEN SATURATION: 98 % | BODY MASS INDEX: 28.09 KG/M2 | HEIGHT: 67 IN | SYSTOLIC BLOOD PRESSURE: 139 MMHG | WEIGHT: 179 LBS

## 2021-09-02 DIAGNOSIS — Z80.0 ENCOUNTER FOR SCREENING FOR MALIGNANT NEOPLASM OF COLON: ICD-10-CM

## 2021-09-02 DIAGNOSIS — Z12.11 ENCOUNTER FOR SCREENING FOR MALIGNANT NEOPLASM OF COLON: ICD-10-CM

## 2021-09-02 PROCEDURE — 99203 OFFICE O/P NEW LOW 30 MIN: CPT

## 2021-09-05 ENCOUNTER — TRANSCRIPTION ENCOUNTER (OUTPATIENT)
Age: 41
End: 2021-09-05

## 2021-09-06 ENCOUNTER — RESULT REVIEW (OUTPATIENT)
Age: 41
End: 2021-09-06

## 2021-09-06 ENCOUNTER — TRANSCRIPTION ENCOUNTER (OUTPATIENT)
Age: 41
End: 2021-09-06

## 2021-09-06 ENCOUNTER — INPATIENT (INPATIENT)
Facility: HOSPITAL | Age: 41
LOS: 0 days | Discharge: HOME | DRG: 343 | End: 2021-09-06
Attending: SURGERY | Admitting: SURGERY
Payer: COMMERCIAL

## 2021-09-06 VITALS
RESPIRATION RATE: 16 BRPM | DIASTOLIC BLOOD PRESSURE: 79 MMHG | TEMPERATURE: 98 F | HEART RATE: 69 BPM | SYSTOLIC BLOOD PRESSURE: 128 MMHG | OXYGEN SATURATION: 100 % | HEIGHT: 67 IN | WEIGHT: 169.98 LBS

## 2021-09-06 VITALS — RESPIRATION RATE: 14 BRPM | HEART RATE: 74 BPM | OXYGEN SATURATION: 99 % | TEMPERATURE: 97 F

## 2021-09-06 DIAGNOSIS — O03.9 COMPLETE OR UNSPECIFIED SPONTANEOUS ABORTION WITHOUT COMPLICATION: Chronic | ICD-10-CM

## 2021-09-06 DIAGNOSIS — K37 UNSPECIFIED APPENDICITIS: ICD-10-CM

## 2021-09-06 LAB
ALBUMIN SERPL ELPH-MCNC: 4.5 G/DL — SIGNIFICANT CHANGE UP (ref 3.3–5)
ALP SERPL-CCNC: 101 U/L — SIGNIFICANT CHANGE UP (ref 40–120)
ALT FLD-CCNC: 12 U/L — SIGNIFICANT CHANGE UP (ref 10–45)
ANION GAP SERPL CALC-SCNC: 13 MMOL/L — SIGNIFICANT CHANGE UP (ref 5–17)
APPEARANCE UR: CLEAR — SIGNIFICANT CHANGE UP
APTT BLD: 30.4 SEC — SIGNIFICANT CHANGE UP (ref 27.5–35.5)
AST SERPL-CCNC: 17 U/L — SIGNIFICANT CHANGE UP (ref 10–40)
BACTERIA # UR AUTO: NEGATIVE — SIGNIFICANT CHANGE UP
BASOPHILS # BLD AUTO: 0.03 K/UL — SIGNIFICANT CHANGE UP (ref 0–0.2)
BASOPHILS NFR BLD AUTO: 0.2 % — SIGNIFICANT CHANGE UP (ref 0–2)
BILIRUB SERPL-MCNC: 0.3 MG/DL — SIGNIFICANT CHANGE UP (ref 0.2–1.2)
BILIRUB UR-MCNC: NEGATIVE — SIGNIFICANT CHANGE UP
BLD GP AB SCN SERPL QL: NEGATIVE — SIGNIFICANT CHANGE UP
BUN SERPL-MCNC: 8 MG/DL — SIGNIFICANT CHANGE UP (ref 7–23)
CALCIUM SERPL-MCNC: 10.3 MG/DL — SIGNIFICANT CHANGE UP (ref 8.4–10.5)
CHLORIDE SERPL-SCNC: 106 MMOL/L — SIGNIFICANT CHANGE UP (ref 96–108)
CO2 SERPL-SCNC: 22 MMOL/L — SIGNIFICANT CHANGE UP (ref 22–31)
COLOR SPEC: COLORLESS — SIGNIFICANT CHANGE UP
CREAT SERPL-MCNC: 0.75 MG/DL — SIGNIFICANT CHANGE UP (ref 0.5–1.3)
DIFF PNL FLD: NEGATIVE — SIGNIFICANT CHANGE UP
EOSINOPHIL # BLD AUTO: 0.12 K/UL — SIGNIFICANT CHANGE UP (ref 0–0.5)
EOSINOPHIL NFR BLD AUTO: 0.8 % — SIGNIFICANT CHANGE UP (ref 0–6)
EPI CELLS # UR: 2 — SIGNIFICANT CHANGE UP
GLUCOSE SERPL-MCNC: 64 MG/DL — LOW (ref 70–99)
GLUCOSE UR QL: NEGATIVE — SIGNIFICANT CHANGE UP
HCG SERPL-ACNC: <2 MIU/ML — SIGNIFICANT CHANGE UP
HCT VFR BLD CALC: 41.2 % — SIGNIFICANT CHANGE UP (ref 34.5–45)
HGB BLD-MCNC: 13 G/DL — SIGNIFICANT CHANGE UP (ref 11.5–15.5)
IMM GRANULOCYTES NFR BLD AUTO: 0.3 % — SIGNIFICANT CHANGE UP (ref 0–1.5)
INR BLD: 1.06 RATIO — SIGNIFICANT CHANGE UP (ref 0.88–1.16)
KETONES UR-MCNC: NEGATIVE — SIGNIFICANT CHANGE UP
LEUKOCYTE ESTERASE UR-ACNC: NEGATIVE — SIGNIFICANT CHANGE UP
LYMPHOCYTES # BLD AUTO: 1.65 K/UL — SIGNIFICANT CHANGE UP (ref 1–3.3)
LYMPHOCYTES # BLD AUTO: 11.3 % — LOW (ref 13–44)
MCHC RBC-ENTMCNC: 26.3 PG — LOW (ref 27–34)
MCHC RBC-ENTMCNC: 31.6 GM/DL — LOW (ref 32–36)
MCV RBC AUTO: 83.2 FL — SIGNIFICANT CHANGE UP (ref 80–100)
MONOCYTES # BLD AUTO: 0.89 K/UL — SIGNIFICANT CHANGE UP (ref 0–0.9)
MONOCYTES NFR BLD AUTO: 6.1 % — SIGNIFICANT CHANGE UP (ref 2–14)
NEUTROPHILS # BLD AUTO: 11.84 K/UL — HIGH (ref 1.8–7.4)
NEUTROPHILS NFR BLD AUTO: 81.3 % — HIGH (ref 43–77)
NITRITE UR-MCNC: NEGATIVE — SIGNIFICANT CHANGE UP
NRBC # BLD: 0 /100 WBCS — SIGNIFICANT CHANGE UP (ref 0–0)
PH UR: 6.5 — SIGNIFICANT CHANGE UP (ref 5–8)
PLATELET # BLD AUTO: 305 K/UL — SIGNIFICANT CHANGE UP (ref 150–400)
POTASSIUM SERPL-MCNC: 3.8 MMOL/L — SIGNIFICANT CHANGE UP (ref 3.5–5.3)
POTASSIUM SERPL-SCNC: 3.8 MMOL/L — SIGNIFICANT CHANGE UP (ref 3.5–5.3)
PROT SERPL-MCNC: 7.4 G/DL — SIGNIFICANT CHANGE UP (ref 6–8.3)
PROT UR-MCNC: NEGATIVE — SIGNIFICANT CHANGE UP
PROTHROM AB SERPL-ACNC: 12.7 SEC — SIGNIFICANT CHANGE UP (ref 10.6–13.6)
RBC # BLD: 4.95 M/UL — SIGNIFICANT CHANGE UP (ref 3.8–5.2)
RBC # FLD: 16.8 % — HIGH (ref 10.3–14.5)
RBC CASTS # UR COMP ASSIST: 1 /HPF — SIGNIFICANT CHANGE UP (ref 0–4)
RH IG SCN BLD-IMP: POSITIVE — SIGNIFICANT CHANGE UP
SARS-COV-2 RNA SPEC QL NAA+PROBE: SIGNIFICANT CHANGE UP
SODIUM SERPL-SCNC: 141 MMOL/L — SIGNIFICANT CHANGE UP (ref 135–145)
SP GR SPEC: 1 — LOW (ref 1.01–1.02)
UROBILINOGEN FLD QL: NEGATIVE — SIGNIFICANT CHANGE UP
WBC # BLD: 14.57 K/UL — HIGH (ref 3.8–10.5)
WBC # FLD AUTO: 14.57 K/UL — HIGH (ref 3.8–10.5)
WBC UR QL: 1 /HPF — SIGNIFICANT CHANGE UP (ref 0–5)

## 2021-09-06 PROCEDURE — 99285 EMERGENCY DEPT VISIT HI MDM: CPT | Mod: 25

## 2021-09-06 PROCEDURE — U0003: CPT

## 2021-09-06 PROCEDURE — 85730 THROMBOPLASTIN TIME PARTIAL: CPT

## 2021-09-06 PROCEDURE — 74177 CT ABD & PELVIS W/CONTRAST: CPT | Mod: MA

## 2021-09-06 PROCEDURE — 88304 TISSUE EXAM BY PATHOLOGIST: CPT | Mod: 26

## 2021-09-06 PROCEDURE — C1889: CPT

## 2021-09-06 PROCEDURE — 86901 BLOOD TYPING SEROLOGIC RH(D): CPT

## 2021-09-06 PROCEDURE — 74177 CT ABD & PELVIS W/CONTRAST: CPT | Mod: 26,MA

## 2021-09-06 PROCEDURE — 81001 URINALYSIS AUTO W/SCOPE: CPT

## 2021-09-06 PROCEDURE — 88342 IMHCHEM/IMCYTCHM 1ST ANTB: CPT

## 2021-09-06 PROCEDURE — 86850 RBC ANTIBODY SCREEN: CPT

## 2021-09-06 PROCEDURE — 85610 PROTHROMBIN TIME: CPT

## 2021-09-06 PROCEDURE — 99285 EMERGENCY DEPT VISIT HI MDM: CPT

## 2021-09-06 PROCEDURE — 87086 URINE CULTURE/COLONY COUNT: CPT

## 2021-09-06 PROCEDURE — 84702 CHORIONIC GONADOTROPIN TEST: CPT

## 2021-09-06 PROCEDURE — 88342 IMHCHEM/IMCYTCHM 1ST ANTB: CPT | Mod: 26

## 2021-09-06 PROCEDURE — 88304 TISSUE EXAM BY PATHOLOGIST: CPT

## 2021-09-06 PROCEDURE — 80053 COMPREHEN METABOLIC PANEL: CPT

## 2021-09-06 PROCEDURE — 96374 THER/PROPH/DIAG INJ IV PUSH: CPT

## 2021-09-06 PROCEDURE — 88341 IMHCHEM/IMCYTCHM EA ADD ANTB: CPT | Mod: 26

## 2021-09-06 PROCEDURE — 76770 US EXAM ABDO BACK WALL COMP: CPT

## 2021-09-06 PROCEDURE — 88341 IMHCHEM/IMCYTCHM EA ADD ANTB: CPT

## 2021-09-06 PROCEDURE — 86900 BLOOD TYPING SEROLOGIC ABO: CPT

## 2021-09-06 PROCEDURE — 76770 US EXAM ABDO BACK WALL COMP: CPT | Mod: 26

## 2021-09-06 PROCEDURE — 85025 COMPLETE CBC W/AUTO DIFF WBC: CPT

## 2021-09-06 PROCEDURE — U0005: CPT

## 2021-09-06 RX ORDER — PIPERACILLIN AND TAZOBACTAM 4; .5 G/20ML; G/20ML
3.38 INJECTION, POWDER, LYOPHILIZED, FOR SOLUTION INTRAVENOUS ONCE
Refills: 0 | Status: COMPLETED | OUTPATIENT
Start: 2021-09-06 | End: 2021-09-06

## 2021-09-06 RX ORDER — ACETAMINOPHEN 500 MG
975 TABLET ORAL EVERY 6 HOURS
Refills: 0 | Status: DISCONTINUED | OUTPATIENT
Start: 2021-09-06 | End: 2021-09-06

## 2021-09-06 RX ORDER — OXYCODONE HYDROCHLORIDE 5 MG/1
5 TABLET ORAL EVERY 4 HOURS
Refills: 0 | Status: DISCONTINUED | OUTPATIENT
Start: 2021-09-06 | End: 2021-09-06

## 2021-09-06 RX ORDER — OXYCODONE HYDROCHLORIDE 5 MG/1
1 TABLET ORAL
Qty: 4 | Refills: 0
Start: 2021-09-06

## 2021-09-06 RX ORDER — ACETAMINOPHEN 500 MG
650 TABLET ORAL ONCE
Refills: 0 | Status: COMPLETED | OUTPATIENT
Start: 2021-09-06 | End: 2021-09-06

## 2021-09-06 RX ORDER — SODIUM CHLORIDE 9 MG/ML
1000 INJECTION, SOLUTION INTRAVENOUS
Refills: 0 | Status: DISCONTINUED | OUTPATIENT
Start: 2021-09-06 | End: 2021-09-06

## 2021-09-06 RX ORDER — OXYCODONE HYDROCHLORIDE 5 MG/1
1 TABLET ORAL
Qty: 10 | Refills: 0
Start: 2021-09-06

## 2021-09-06 RX ORDER — PIPERACILLIN AND TAZOBACTAM 4; .5 G/20ML; G/20ML
3.38 INJECTION, POWDER, LYOPHILIZED, FOR SOLUTION INTRAVENOUS EVERY 8 HOURS
Refills: 0 | Status: DISCONTINUED | OUTPATIENT
Start: 2021-09-06 | End: 2021-09-06

## 2021-09-06 RX ORDER — HYDROMORPHONE HYDROCHLORIDE 2 MG/ML
0.5 INJECTION INTRAMUSCULAR; INTRAVENOUS; SUBCUTANEOUS
Refills: 0 | Status: DISCONTINUED | OUTPATIENT
Start: 2021-09-06 | End: 2021-09-06

## 2021-09-06 RX ORDER — HYDROMORPHONE HYDROCHLORIDE 2 MG/ML
1 INJECTION INTRAMUSCULAR; INTRAVENOUS; SUBCUTANEOUS
Refills: 0 | Status: DISCONTINUED | OUTPATIENT
Start: 2021-09-06 | End: 2021-09-06

## 2021-09-06 RX ORDER — SODIUM CHLORIDE 9 MG/ML
1000 INJECTION INTRAMUSCULAR; INTRAVENOUS; SUBCUTANEOUS ONCE
Refills: 0 | Status: COMPLETED | OUTPATIENT
Start: 2021-09-06 | End: 2021-09-06

## 2021-09-06 RX ORDER — ENOXAPARIN SODIUM 100 MG/ML
40 INJECTION SUBCUTANEOUS EVERY 24 HOURS
Refills: 0 | Status: DISCONTINUED | OUTPATIENT
Start: 2021-09-06 | End: 2021-09-06

## 2021-09-06 RX ORDER — ONDANSETRON 8 MG/1
8 TABLET, FILM COATED ORAL ONCE
Refills: 0 | Status: DISCONTINUED | OUTPATIENT
Start: 2021-09-06 | End: 2021-09-06

## 2021-09-06 RX ADMIN — SODIUM CHLORIDE 1000 MILLILITER(S): 9 INJECTION INTRAMUSCULAR; INTRAVENOUS; SUBCUTANEOUS at 07:50

## 2021-09-06 RX ADMIN — PIPERACILLIN AND TAZOBACTAM 200 GRAM(S): 4; .5 INJECTION, POWDER, LYOPHILIZED, FOR SOLUTION INTRAVENOUS at 07:50

## 2021-09-06 RX ADMIN — Medication 650 MILLIGRAM(S): at 07:42

## 2021-09-06 RX ADMIN — Medication 650 MILLIGRAM(S): at 04:18

## 2021-09-06 NOTE — ED PROVIDER NOTE - NS ED ROS FT
CONST: no fevers, no chills  EYES: no pain, no vision changes  ENT: no sore throat, no ear pain, no change in hearing  CV: no chest pain, no leg swelling  RESP: no shortness of breath, no cough  ABD: + abdominal pain, no nausea, no vomiting, no diarrhea  : no dysuria, + flank pain, no hematuria  MSK: no back pain, no extremity pain  NEURO: no headache or additional neurologic complaints  HEME: no easy bleeding  SKIN:  no rash

## 2021-09-06 NOTE — H&P ADULT - HISTORY OF PRESENT ILLNESS
Patient is a 39yo F with a history of PCOS and migraines presenting with abdominal pain. Patient reports acute onset abdominal pain that woke her up from sleep last night, associated with nausea but no vomiting, fevers or chills. Pain was initially periumbilical and then became more generalized, migrating to her RLQ. Patient had pyelonephritis in 2019 and she initially thought this pain was a recurrence of it. Has never had a colonoscopy before but is scheduled for one on October 8th due to recent diagnosis of colon cancer in her mom.    In the ED, she was afebrile and hemodynamically stable. Labs showed WBC 14.6, otherwise normal. CT showed a dilated thickened 1cm appendix with no perforation. Renal US was negative for acute pathology.

## 2021-09-06 NOTE — ED PROVIDER NOTE - PHYSICAL EXAMINATION
GENERAL: Awake, alert, NAD  HEENT: NC/AT, moist mucous membranes  LUNGS: CTAB, no wheezes or crackles   CARDIAC: RRR, no m/r/g  ABDOMEN: Soft, normal BS, non tender, non distended, no rebound, no guarding  BACK: No midline spinal tenderness, +mild R sided CVA tenderness  EXT: No edema, no calf tenderness, 2+ DP pulses bilaterally, no deformities.  NEURO: A&Ox3. Moving all extremities.  SKIN: Warm and dry. No rash.  PSYCH: Normal affect. GENERAL: Awake, alert, NAD  HEENT: NC/AT, moist mucous membranes  LUNGS: CTAB, no wheezes or crackles   CARDIAC: RRR, no m/r/g  ABDOMEN: Mild RLQ TTP  BACK: No midline spinal tenderness, +mild R sided CVA tenderness  EXT: No edema, no calf tenderness, 2+ DP pulses bilaterally, no deformities.  NEURO: A&Ox3. Moving all extremities.  SKIN: Warm and dry. No rash.  PSYCH: Normal affect.

## 2021-09-06 NOTE — H&P ADULT - NSHPSOURCEINFORD_GEN_ALL_CORE
Patient PAST MEDICAL HISTORY:  BPH (benign prostatic hyperplasia)     Calculus of gallbladder with acute cholecystitis     Hyperlipemia     Hypertension     Stroke (5/2019, no neuro deficits)    Type 2 diabetes mellitus

## 2021-09-06 NOTE — ED ADULT NURSE REASSESSMENT NOTE - NS ED NURSE REASSESS COMMENT FT1
Patient undressed and transport here to bring patient to Same Day Surgery. Patient's clothing and shoes are secured in patient belonging bag (one bag) with patient labels and is being stored in cabinet under tree in ED (Green hallway). Valuables are in safe - patient copy and chart copy went in chart with patient to same day surgery.
Report received from PABLO Earl. Patient is pending surgery consult for possible appendicitis shown on CT scan. VSS. Safety maintained at all times, bed in lowest position, call bell in reach. Will continue to monitor closely.

## 2021-09-06 NOTE — H&P ADULT - ASSESSMENT
Patient is a 39yo F with a history of PCOS and migraines presenting with abdominal pain consistent with acute uncomplicated appendicitis. Patient stable and nontoxic appearing.    Plan:  - Admit to surgery under Dr. Perez Pritchard, floor bed  - Booked for OR for laparoscopic appendectomy  - NPO/IVF  - Zosyn  - Pain control PRN    D/w Dr. Macho Mendez, PGY2  [place] [facility] [id number]

## 2021-09-06 NOTE — DISCHARGE NOTE PROVIDER - HOSPITAL COURSE
HPI  Patient is a 41yo F with a history of PCOS and migraines presenting with abdominal pain. Patient reports acute onset abdominal pain that woke her up from sleep last night, associated with nausea but no vomiting, fevers or chills. Pain was initially periumbilical and then became more generalized, migrating to her RLQ. Patient had pyelonephritis in 2019 and she initially thought this pain was a recurrence of it. Has never had a colonoscopy before but is scheduled for one on October 8th due to recent diagnosis of colon cancer in her mom.    In the ED, she was afebrile and hemodynamically stable. Labs showed WBC 14.6, otherwise normal. CT showed a dilated thickened 1cm appendix with no perforation. Renal US was negative for acute pathology.      HOSPITAL COURSE    The patient was admitted to the hospital with a diagnosis of acute appendicitis. They received zosyn, and were brought to the operating room for a laparoscopic appendectomy. The operation was uncomplicated and the patient tolerated it well. Postoperatively they were tolerating PO intake and their pain was well under control. They were discharged to home in good condition.

## 2021-09-06 NOTE — ED ADULT NURSE NOTE - OBJECTIVE STATEMENT
Pt is a 40y Female c/o R flank pain radiating to abdomen. Pt states pain woke her from sleep. Pt states she took aspirin at home and 1mg of Ativan to help her go back to sleep, however pt states the abdominal pain seemed to dissipate, however the flank pain is still present. Upon assessment, Pain appears to be more midline and spinal in nature than flank. Pt also does not seem to be tender to her abdomen. Pt prefers to go by Connie and uses She/Her/Hers pronouns. Pt resting comfortably in bed with MD at bedside. Pt educated on call bell use and call bell placed at bedside. Pt safety maintained. Pt is a 40y Female c/o R flank pain radiating to abdomen. Pt states pain woke her from sleep. Pt states she took aspirin at home and 1mg of Ativan to help her go back to sleep, however pt states the abdominal pain seemed to dissipate, however the flank pain is still present. Pt has an IUD. Pt .  Upon assessment, Pain appears to be more midline and spinal in nature than flank. Pt also does not seem to be tender to her abdomen. Pt prefers to go by Connie and uses She/Her/Hers pronouns. Pt resting comfortably in bed with MD at bedside. Pt educated on call bell use and call bell placed at bedside. Pt safety maintained.

## 2021-09-06 NOTE — ED PROVIDER NOTE - NS_EDPROVIDERDISPOUSERTYPE_ED_A_ED
Detail Level: Detailed Dapsone Pregnancy And Lactation Text: This medication is Pregnancy Category C and is not considered safe during pregnancy or breast feeding. Isotretinoin Counseling: Patient should get monthly blood tests, not donate blood, not drive at night if vision affected, not share medication, and not undergo elective surgery for 6 months after tx completed. Side effects reviewed, pt to contact office should one occur. Tazorac Counseling:  Patient advised that medication is irritating and drying.  Patient may need to apply sparingly and wash off after an hour before eventually leaving it on overnight.  The patient verbalized understanding of the proper use and possible adverse effects of tazorac.  All of the patient's questions and concerns were addressed. Bactrim Counseling:  I discussed with the patient the risks of sulfa antibiotics including but not limited to GI upset, allergic reaction, drug rash, diarrhea, dizziness, photosensitivity, and yeast infections.  Rarely, more serious reactions can occur including but not limited to aplastic anemia, agranulocytosis, methemoglobinemia, blood dyscrasias, liver or kidney failure, lung infiltrates or desquamative/blistering drug rashes. Topical Sulfur Applications Pregnancy And Lactation Text: This medication is Pregnancy Category C and has an unknown safety profile during pregnancy. It is unknown if this topical medication is excreted in breast milk. Minocycline Counseling: Patient advised regarding possible photosensitivity and discoloration of the teeth, skin, lips, tongue and gums.  Patient instructed to avoid sunlight, if possible.  When exposed to sunlight, patients should wear protective clothing, sunglasses, and sunscreen.  The patient was instructed to call the office immediately if the following severe adverse effects occur:  hearing changes, easy bruising/bleeding, severe headache, or vision changes.  The patient verbalized understanding of the proper use and possible adverse effects of minocycline.  All of the patient's questions and concerns were addressed. Tetracycline Pregnancy And Lactation Text: This medication is Pregnancy Category D and not consider safe during pregnancy. It is also excreted in breast milk. Dapsone Counseling: I discussed with the patient the risks of dapsone including but not limited to hemolytic anemia, agranulocytosis, rashes, methemoglobinemia, kidney failure, peripheral neuropathy, headaches, GI upset, and liver toxicity.  Patients who start dapsone require monitoring including baseline LFTs and weekly CBCs for the first month, then every month thereafter.  The patient verbalized understanding of the proper use and possible adverse effects of dapsone.  All of the patient's questions and concerns were addressed. Erythromycin Pregnancy And Lactation Text: This medication is Pregnancy Category B and is considered safe during pregnancy. It is also excreted in breast milk. Azithromycin Pregnancy And Lactation Text: This medication is considered safe during pregnancy and is also secreted in breast milk. Detail Level: Zone Topical Sulfur Applications Counseling: Topical Sulfur Counseling: Patient counseled that this medication may cause skin irritation or allergic reactions.  In the event of skin irritation, the patient was advised to reduce the amount of the drug applied or use it less frequently.   The patient verbalized understanding of the proper use and possible adverse effects of topical sulfur application.  All of the patient's questions and concerns were addressed. Tetracycline Counseling: Patient counseled regarding possible photosensitivity and increased risk for sunburn.  Patient instructed to avoid sunlight, if possible.  When exposed to sunlight, patients should wear protective clothing, sunglasses, and sunscreen.  The patient was instructed to call the office immediately if the following severe adverse effects occur:  hearing changes, easy bruising/bleeding, severe headache, or vision changes.  The patient verbalized understanding of the proper use and possible adverse effects of tetracycline.  All of the patient's questions and concerns were addressed. Patient understands to avoid pregnancy while on therapy due to potential birth defects. Topical Retinoid Pregnancy And Lactation Text: This medication is Pregnancy Category C. It is unknown if this medication is excreted in breast milk. Erythromycin Counseling:  I discussed with the patient the risks of erythromycin including but not limited to GI upset, allergic reaction, drug rash, diarrhea, increase in liver enzymes, and yeast infections. High Dose Vitamin A Pregnancy And Lactation Text: High dose vitamin A therapy is contraindicated during pregnancy and breast feeding. Azithromycin Counseling:  I discussed with the patient the risks of azithromycin including but not limited to GI upset, allergic reaction, drug rash, diarrhea, and yeast infections. Topical Clindamycin Pregnancy And Lactation Text: This medication is Pregnancy Category B and is considered safe during pregnancy. It is unknown if it is excreted in breast milk. Birth Control Pills Pregnancy And Lactation Text: This medication should be avoided if pregnant and for the first 30 days post-partum. Spironolactone Pregnancy And Lactation Text: This medication can cause feminization of the male fetus and should be avoided during pregnancy. The active metabolite is also found in breast milk. Use Enhanced Medication Counseling?: No Topical Retinoid counseling:  Patient advised to apply a pea-sized amount only at bedtime and wait 30 minutes after washing their face before applying.  If too drying, patient may add a non-comedogenic moisturizer. The patient verbalized understanding of the proper use and possible adverse effects of retinoids.  All of the patient's questions and concerns were addressed. High Dose Vitamin A Counseling: Side effects reviewed, pt to contact office should one occur. Birth Control Pills Counseling: Birth Control Pill Counseling: I discussed with the patient the potential side effects of OCPs including but not limited to increased risk of stroke, heart attack, thrombophlebitis, deep venous thrombosis, hepatic adenomas, breast changes, GI upset, headaches, and depression.  The patient verbalized understanding of the proper use and possible adverse effects of OCPs. All of the patient's questions and concerns were addressed. Doxycycline Pregnancy And Lactation Text: This medication is Pregnancy Category D and not consider safe during pregnancy. It is also excreted in breast milk but is considered safe for shorter treatment courses. Benzoyl Peroxide Pregnancy And Lactation Text: This medication is Pregnancy Category C. It is unknown if benzoyl peroxide is excreted in breast milk. Topical Clindamycin Counseling: Patient counseled that this medication may cause skin irritation or allergic reactions.  In the event of skin irritation, the patient was advised to reduce the amount of the drug applied or use it less frequently.   The patient verbalized understanding of the proper use and possible adverse effects of clindamycin.  All of the patient's questions and concerns were addressed. Isotretinoin Pregnancy And Lactation Text: This medication is Pregnancy Category X and is considered extremely dangerous during pregnancy. It is unknown if it is excreted in breast milk. Spironolactone Counseling: Patient advised regarding risks of diarrhea, abdominal pain, hyperkalemia, birth defects (for female patients), liver toxicity and renal toxicity. The patient may need blood work to monitor liver and kidney function and potassium levels while on therapy. The patient verbalized understanding of the proper use and possible adverse effects of spironolactone.  All of the patient's questions and concerns were addressed. Bactrim Pregnancy And Lactation Text: This medication is Pregnancy Category D and is known to cause fetal risk.  It is also excreted in breast milk. Doxycycline Counseling:  Patient counseled regarding possible photosensitivity and increased risk for sunburn.  Patient instructed to avoid sunlight, if possible.  When exposed to sunlight, patients should wear protective clothing, sunglasses, and sunscreen.  The patient was instructed to call the office immediately if the following severe adverse effects occur:  hearing changes, easy bruising/bleeding, severe headache, or vision changes.  The patient verbalized understanding of the proper use and possible adverse effects of doxycycline.  All of the patient's questions and concerns were addressed. Benzoyl Peroxide Counseling: Patient counseled that medicine may cause skin irritation and bleach clothing.  In the event of skin irritation, the patient was advised to reduce the amount of the drug applied or use it less frequently.   The patient verbalized understanding of the proper use and possible adverse effects of benzoyl peroxide.  All of the patient's questions and concerns were addressed. Tazorac Pregnancy And Lactation Text: This medication is not safe during pregnancy. It is unknown if this medication is excreted in breast milk. Attending Attestation (For Attendings USE Only)...

## 2021-09-06 NOTE — ED PROVIDER NOTE - CLINICAL SUMMARY MEDICAL DECISION MAKING FREE TEXT BOX
39 y/o F with hx of PCOS, pyelonephritis presenting to the ED c/o abdominal pain. Vitals stable. Patient well appearing in no acute distress. Exam with mild R CVA tenderness. Will check labs including cbc, cmp, UA and U/S kidney and bladder to evaluate for hydro. Will reassess following labs and imaging, no current abdominal tenderness on initial exam. Vasiliy Strauss, DO PGY3 39 y/o F with hx of PCOS, pyelonephritis presenting to the ED c/o abdominal pain. Vitals stable. Patient well appearing in no acute distress. Exam with mild R CVA tenderness. Will check labs including cbc, cmp, UA and U/S kidney and bladder to evaluate for hydro. Will reassess following labs and imaging, no current abdominal tenderness on initial exam. Vasiliy Strauss, DO PGY3    Attending MD Soto : 40 F p/w R sided flank pain waking her up from sleep. On exam, patient noted to have RLQ TTP. Will obtain CTAP to assess for acute appy.

## 2021-09-06 NOTE — PRE-ANESTHESIA EVALUATION ADULT - NSANTHOSAYNRD_GEN_A_CORE
No. PANCHITO screening performed.  STOP BANG Legend: 0-2 = LOW Risk; 3-4 = INTERMEDIATE Risk; 5-8 = HIGH Risk

## 2021-09-06 NOTE — H&P ADULT - ATTENDING COMMENTS
I have seen and examined the patient.  I agree with the surgical resident's note. I have reviewed the CT images.  The patient has acute AP and is booked for an appendectomy    Perez Pritchard contact information [phone]

## 2021-09-06 NOTE — H&P ADULT - NSHPLABSRESULTS_GEN_ALL_CORE
LABS:                          13.0   14.57 )-----------( 305      ( 06 Sep 2021 06:14 )             41.2           141  |  106  |  8   ----------------------------<  64<L>  3.8   |  22  |  0.75    Ca    10.3      06 Sep 2021 04:31    TPro  7.4  /  Alb  4.5  /  TBili  0.3  /  DBili  x   /  AST  17  /  ALT  12  /  AlkPhos  101          Urinalysis Basic - ( 06 Sep 2021 04:39 )    Color: Colorless / Appearance: Clear / S.005 / pH: x  Gluc: x / Ketone: Negative  / Bili: Negative / Urobili: Negative   Blood: x / Protein: Negative / Nitrite: Negative   Leuk Esterase: Negative / RBC: 1 /hpf / WBC 1 /HPF   Sq Epi: x / Non Sq Epi: 2 / Bacteria: Negative    PT/INR - ( 06 Sep 2021 07:10 )   PT: 12.7 sec;   INR: 1.06 ratio      PTT - ( 06 Sep 2021 07:10 )  PTT:30.4 sec    _______________________________________  RADIOLOGY & ADDITIONAL STUDIES:    < from: CT Abdomen and Pelvis w/ IV Cont (21 @ 06:04) >    EXAM:  CT ABDOMEN AND PELVIS IC                          PROCEDURE DATE:  2021      INTERPRETATION:  CLINICAL INFORMATION: Right-sided flank pain    COMPARISON: CT abdomen pelvis 3/29/2019.    CONTRAST/COMPLICATIONS:  IV Contrast: Omnipaque 350  90 cc administered   10 cc discarded  Oral Contrast: None  Complications: None reported at time of study completion.    PROCEDURE:  CT of the Abdomen and Pelvis was performed.  Sagittal and coronal reformats were performed.    FINDINGS:  LOWER CHEST: Mild linear bibasilar subsegmental atelectasis versus scarring.    LIVER: Within normal limits.  BILE DUCTS: Normal caliber.  GALLBLADDER: Within normal limits.  SPLEEN: Within normal limits.  PANCREAS: Within normal limits.  ADRENALS: Within normal limits.  KIDNEYS/URETERS: Scarring and volume loss involving the lower pole of the right kidney. No hydronephrosis.    BLADDER: Within normal limits.  REPRODUCTIVE ORGANS: Intrauterine retroverted uterus. Device in place.    BOWEL: No bowelobstruction. The appendix is thickened, measuring up to 1 cm in diameter, with trace periappendiceal fat, compatible with acute uncomplicated appendicitis.  PERITONEUM: No ascites.  VESSELS: Within normal limits.  RETROPERITONEUM/LYMPH NODES: No lymphadenopathy.  ABDOMINAL WALL: Within normal limits.  BONES: Unchanged chronic T12 wedge deformity.    IMPRESSION:  Acute uncomplicated appendicitis.    < end of copied text >

## 2021-09-06 NOTE — ED PROVIDER NOTE - OBJECTIVE STATEMENT
39 y/o F with hx of PCOS, pyelonephritis presenting to the ED c/o R sided flank pain. States she woke up from sleep with some generalized abdominal pain that localized more towards the R flank but seems to radiate to her abdomen. States she had similar pain when she was diagnosed with pyelo a few years ago. She denies any urinary symptoms including dysuria or hematuria. No fever, chills, nausea, or vomiting. No diarrhea. 41 y/o F with hx of PCOS, pyelonephritis presenting to the ED c/o improving dull R sided flank pain. States she woke up from sleep with some generalized abdominal pain that localized more towards the R flank but seems to radiate to her abdomen. States she had similar pain when she was diagnosed with pyelo a few years ago. She denies any urinary symptoms including dysuria or hematuria. No fever, chills, nausea, or vomiting. No diarrhea. LMP last month

## 2021-09-06 NOTE — H&P ADULT - NSHPPHYSICALEXAM_GEN_ALL_CORE
VITALS:  Vital Signs Last 24 Hrs  T(C): 36.6 (06 Sep 2021 07:34), Max: 36.9 (06 Sep 2021 02:51)  T(F): 97.8 (06 Sep 2021 07:34), Max: 98.4 (06 Sep 2021 02:51)  HR: 65 (06 Sep 2021 07:34) (65 - 69)  BP: 118/72 (06 Sep 2021 07:34) (118/72 - 128/79)  BP(mean): --  RR: 17 (06 Sep 2021 07:34) (16 - 17)  SpO2: 100% (06 Sep 2021 07:34) (100% - 100%)    PHYSICAL EXAM:  Gen: No acute distress.  Abd: Soft, tender over RLQ with focal rebound, no guarding, nondistended  Ext: Warm and well-perfused

## 2021-09-06 NOTE — ED PROVIDER NOTE - PROGRESS NOTE DETAILS
Patient with mild RLQ tenderness on reassessment, will obtain CT to r/o appendicitis. Vasiliy Strauss, DO PGY3 Neal Manley MD, Attending: Received sign out on patient. pending surgery eval for appy. Neal Manley MD, Attending: spoke with the surgery and will admit.

## 2021-09-06 NOTE — ED ADULT NURSE NOTE - NSICDXFAMILYHX_GEN_ALL_CORE_FT
FAMILY HISTORY:  Father  Still living? Unknown  Family history of hypertension, Age at diagnosis: Age Unknown

## 2021-09-06 NOTE — DISCHARGE NOTE NURSING/CASE MANAGEMENT/SOCIAL WORK - PATIENT PORTAL LINK FT
You can access the FollowMyHealth Patient Portal offered by [facility] by registering at the following website: [email or web address]. By joining [employer]’s FollowMyHealth portal, you will also be able to view your health information using other applications (apps) compatible with our system.

## 2021-09-06 NOTE — DISCHARGE NOTE PROVIDER - NSDCMRMEDTOKEN_GEN_ALL_CORE_FT
Ativan 0.5 mg oral tablet: 1 tab(s) orally once a day, As Needed  oxyCODONE 5 mg oral tablet: 1 tab(s) orally every 6 to 8 hours, As Needed -for severe pain MDD:4 tabs    Ativan 0.5 mg oral tablet: 1 tab(s) orally once a day, As Needed  oxyCODONE 5 mg oral capsule: 1 cap(s) orally every 4 hours MDD:20mg

## 2021-09-06 NOTE — DISCHARGE NOTE PROVIDER - CARE PROVIDER_API CALL
Perez Pritchard)  Surgery  [address], [address]  [place] [address]  Phone: [phone]  Fax: [phone]  Follow Up Time:

## 2021-09-07 ENCOUNTER — NON-APPOINTMENT (OUTPATIENT)
Age: 41
End: 2021-09-07

## 2021-09-07 LAB
CULTURE RESULTS: SIGNIFICANT CHANGE UP
SPECIMEN SOURCE: SIGNIFICANT CHANGE UP

## 2021-09-13 LAB — SURGICAL PATHOLOGY STUDY: SIGNIFICANT CHANGE UP

## 2021-09-17 ENCOUNTER — TRANSCRIPTION ENCOUNTER (OUTPATIENT)
Age: 41
End: 2021-09-17

## 2021-09-21 ENCOUNTER — APPOINTMENT (OUTPATIENT)
Dept: SURGERY | Facility: CLINIC | Age: 41
End: 2021-09-21
Payer: COMMERCIAL

## 2021-09-21 VITALS
SYSTOLIC BLOOD PRESSURE: 136 MMHG | BODY MASS INDEX: 26.68 KG/M2 | RESPIRATION RATE: 17 BRPM | HEIGHT: 67 IN | OXYGEN SATURATION: 96 % | HEART RATE: 80 BPM | DIASTOLIC BLOOD PRESSURE: 86 MMHG | WEIGHT: 170 LBS | TEMPERATURE: 202.46 F

## 2021-09-21 DIAGNOSIS — Z98.890 OTHER SPECIFIED POSTPROCEDURAL STATES: ICD-10-CM

## 2021-09-21 PROCEDURE — 99024 POSTOP FOLLOW-UP VISIT: CPT

## 2021-09-21 RX ORDER — SODIUM SULFATE, POTASSIUM SULFATE, MAGNESIUM SULFATE 17.5; 3.13; 1.6 G/ML; G/ML; G/ML
17.5-3.13-1.6 SOLUTION, CONCENTRATE ORAL
Qty: 1 | Refills: 0 | Status: DISCONTINUED | COMMUNITY
Start: 2021-09-02 | End: 2021-09-21

## 2021-09-21 RX ORDER — TRIAMCINOLONE ACETONIDE 1 MG/G
0.1 CREAM TOPICAL
Qty: 1 | Refills: 1 | Status: DISCONTINUED | COMMUNITY
Start: 2020-12-29 | End: 2021-09-21

## 2021-09-21 RX ORDER — METFORMIN HYDROCHLORIDE 1000 MG/1
1000 TABLET, COATED ORAL TWICE DAILY
Qty: 60 | Refills: 3 | Status: DISCONTINUED | COMMUNITY
End: 2021-09-21

## 2021-09-21 NOTE — HISTORY OF PRESENT ILLNESS
[de-identified] : Connie is a 40 y/o female s/p Laparoscopic appendectomy and placement of a Aragon catheter on 09/06/21. Pathology acute appendicitis w/ periappendicitis.

## 2021-09-29 ENCOUNTER — NON-APPOINTMENT (OUTPATIENT)
Age: 41
End: 2021-09-29

## 2021-09-30 ENCOUNTER — TRANSCRIPTION ENCOUNTER (OUTPATIENT)
Age: 41
End: 2021-09-30

## 2021-09-30 ENCOUNTER — OUTPATIENT (OUTPATIENT)
Dept: OUTPATIENT SERVICES | Facility: HOSPITAL | Age: 41
LOS: 1 days | Discharge: HOME | End: 2021-09-30

## 2021-09-30 ENCOUNTER — NON-APPOINTMENT (OUTPATIENT)
Age: 41
End: 2021-09-30

## 2021-09-30 DIAGNOSIS — O03.9 COMPLETE OR UNSPECIFIED SPONTANEOUS ABORTION WITHOUT COMPLICATION: Chronic | ICD-10-CM

## 2021-09-30 DIAGNOSIS — C49.A0 GASTROINTESTINAL STROMAL TUMOR, UNSPECIFIED SITE: ICD-10-CM

## 2021-10-01 ENCOUNTER — TRANSCRIPTION ENCOUNTER (OUTPATIENT)
Age: 41
End: 2021-10-01

## 2021-10-04 DIAGNOSIS — Z01.818 ENCOUNTER FOR OTHER PREPROCEDURAL EXAMINATION: ICD-10-CM

## 2021-10-05 ENCOUNTER — APPOINTMENT (OUTPATIENT)
Dept: DISASTER EMERGENCY | Facility: CLINIC | Age: 41
End: 2021-10-05

## 2021-10-05 LAB — SARS-COV-2 N GENE NPH QL NAA+PROBE: NOT DETECTED

## 2021-10-06 ENCOUNTER — APPOINTMENT (OUTPATIENT)
Dept: HEMATOLOGY ONCOLOGY | Facility: CLINIC | Age: 41
End: 2021-10-06
Payer: COMMERCIAL

## 2021-10-06 VITALS — HEIGHT: 67 IN | WEIGHT: 170.99 LBS | BODY MASS INDEX: 26.84 KG/M2

## 2021-10-06 PROCEDURE — 99205 OFFICE O/P NEW HI 60 MIN: CPT

## 2021-10-07 ENCOUNTER — TRANSCRIPTION ENCOUNTER (OUTPATIENT)
Age: 41
End: 2021-10-07

## 2021-10-07 NOTE — RESULTS/DATA
[FreeTextEntry1] : EXAM: CT ABDOMEN AND PELVIS IC\par \par \par PROCEDURE DATE: 09/06/2021\par \par \par \par \par INTERPRETATION: CLINICAL INFORMATION: Right-sided flank pain\par \par COMPARISON: CT abdomen pelvis 3/29/2019.\par \par CONTRAST/COMPLICATIONS:\par IV Contrast: Omnipaque 350 90 cc administered 10 cc discarded\par Oral Contrast: None\par Complications: None reported at time of study completion.\par \par PROCEDURE:\par CT of the Abdomen and Pelvis was performed.\par Sagittal and coronal reformats were performed.\par \par FINDINGS:\par LOWER CHEST: Mild linear bibasilar subsegmental atelectasis versus scarring.\par \par LIVER: Within normal limits.\par BILE DUCTS: Normal caliber.\par GALLBLADDER: Within normal limits.\par SPLEEN: Within normal limits.\par PANCREAS: Within normal limits.\par ADRENALS: Within normal limits.\par KIDNEYS/URETERS: Scarring and volume loss involving the lower pole of the right kidney. No hydronephrosis.\par \par BLADDER: Within normal limits.\par REPRODUCTIVE ORGANS: Intrauterine retroverted uterus. Device in place.\par \par BOWEL: No bowel obstruction. The appendix is thickened, measuring up to 1 cm in diameter, with trace periappendiceal fat, compatible with acute uncomplicated appendicitis.\par PERITONEUM: No ascites.\par VESSELS: Within normal limits.\par RETROPERITONEUM/LYMPH NODES: No lymphadenopathy.\par ABDOMINAL WALL: Within normal limits.\par BONES: Unchanged chronic T12 wedge deformity.\par \par IMPRESSION:\par Acute uncomplicated appendicitis.\par \par --- End of Report ---\par \par

## 2021-10-07 NOTE — CONSULT LETTER
[Dear  ___] : Dear  [unfilled], [Consult Letter:] : I had the pleasure of evaluating your patient, [unfilled]. [Please see my note below.] : Please see my note below. [Consult Closing:] : Thank you very much for allowing me to participate in the care of this patient.  If you have any questions, please do not hesitate to contact me. [Sincerely,] : Sincerely, [FreeTextEntry3] : Grady Alexander MD, FACP \par  of Medicine \par Division of Hematology/Oncology\par [facility]\par [facility] \par [address]\[place] [address]\par Tel: [phone]\par Fax: [phone]\par \par \par

## 2021-10-07 NOTE — HISTORY OF PRESENT ILLNESS
[Disease: _____________________] : Disease: [unfilled] [T: ___] : T[unfilled] [N: ___] : N[unfilled] [M: ___] : M[unfilled] [AJCC Stage: ____] : AJCC Stage: [unfilled] [de-identified] : Patient with known PMHx of PCOS (previously on Metformin but now controlled with IUD), migraines and previous pyelonephritis presented to the [facility] on 9/6/21 c/o right flank and right sided abdominal pain with nausea x 1 day initially thought to be a recurrence of pyelonephritis.  Patient had a CT A/P while in the ED that showed a thickened appendix measuring up to 1 cm in diameter with trace periappendiceal fat consistent with acute appendicitis.  Patient had a laparoscopic appendectomy with Dr. Perez Pritchard and was discharged the same day.  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  Patient presents today to establish oncologic care.  Patient reports that she is scheduled for an endoscopy/colonoscopy on 10/8/21 previously planned due to recent FDR colon cancer diagnosis (mother).  Patient states that since the surgery she feels well.  Denies anorexia, weight loss, abdominal pain, N/V, diarrhea, constipation, bleeding.\par \par no tobacco, + vegan\par , one child (adopted)\par Works in [employer] raising\par \par Mother - colon cancer\par Paternal Grandmother - lung cancer (smoker) [de-identified] : 0.8 cm in size, negative margins, low grade mitotic rate 1 in 5 mm2 area, LOW RISK  [de-identified] : KIT () and DOG1 (ANO1) positive

## 2021-10-08 ENCOUNTER — APPOINTMENT (OUTPATIENT)
Dept: GASTROENTEROLOGY | Facility: HOSPITAL | Age: 41
End: 2021-10-08
Payer: COMMERCIAL

## 2021-10-08 ENCOUNTER — OUTPATIENT (OUTPATIENT)
Dept: OUTPATIENT SERVICES | Facility: HOSPITAL | Age: 41
LOS: 1 days | Discharge: HOME | End: 2021-10-08
Payer: COMMERCIAL

## 2021-10-08 ENCOUNTER — RESULT REVIEW (OUTPATIENT)
Age: 41
End: 2021-10-08

## 2021-10-08 VITALS
HEIGHT: 67 IN | TEMPERATURE: 97 F | OXYGEN SATURATION: 100 % | RESPIRATION RATE: 13 BRPM | SYSTOLIC BLOOD PRESSURE: 123 MMHG | DIASTOLIC BLOOD PRESSURE: 90 MMHG | HEART RATE: 70 BPM | WEIGHT: 169.98 LBS

## 2021-10-08 VITALS
HEART RATE: 58 BPM | OXYGEN SATURATION: 100 % | DIASTOLIC BLOOD PRESSURE: 75 MMHG | RESPIRATION RATE: 15 BRPM | SYSTOLIC BLOOD PRESSURE: 115 MMHG

## 2021-10-08 DIAGNOSIS — Z12.11 ENCOUNTER FOR SCREENING FOR MALIGNANT NEOPLASM OF COLON: ICD-10-CM

## 2021-10-08 DIAGNOSIS — Z98.890 OTHER SPECIFIED POSTPROCEDURAL STATES: Chronic | ICD-10-CM

## 2021-10-08 DIAGNOSIS — O03.9 COMPLETE OR UNSPECIFIED SPONTANEOUS ABORTION WITHOUT COMPLICATION: Chronic | ICD-10-CM

## 2021-10-08 DIAGNOSIS — Z90.49 ACQUIRED ABSENCE OF OTHER SPECIFIED PARTS OF DIGESTIVE TRACT: Chronic | ICD-10-CM

## 2021-10-08 LAB — HCG UR QL: NEGATIVE — SIGNIFICANT CHANGE UP

## 2021-10-08 PROCEDURE — 88305 TISSUE EXAM BY PATHOLOGIST: CPT | Mod: 26

## 2021-10-08 PROCEDURE — 43239 EGD BIOPSY SINGLE/MULTIPLE: CPT | Mod: GC

## 2021-10-08 PROCEDURE — 45378 DIAGNOSTIC COLONOSCOPY: CPT

## 2021-10-08 PROCEDURE — 43239 EGD BIOPSY SINGLE/MULTIPLE: CPT

## 2021-10-08 PROCEDURE — 45378 DIAGNOSTIC COLONOSCOPY: CPT | Mod: GC

## 2021-10-12 LAB — SURGICAL PATHOLOGY STUDY: SIGNIFICANT CHANGE UP

## 2021-10-14 ENCOUNTER — NON-APPOINTMENT (OUTPATIENT)
Age: 41
End: 2021-10-14

## 2021-10-18 ENCOUNTER — TRANSCRIPTION ENCOUNTER (OUTPATIENT)
Age: 41
End: 2021-10-18

## 2021-11-17 ENCOUNTER — OUTPATIENT (OUTPATIENT)
Dept: OUTPATIENT SERVICES | Facility: HOSPITAL | Age: 41
LOS: 1 days | Discharge: HOME | End: 2021-11-17

## 2021-11-17 DIAGNOSIS — Z98.890 OTHER SPECIFIED POSTPROCEDURAL STATES: Chronic | ICD-10-CM

## 2021-11-17 DIAGNOSIS — Z90.49 ACQUIRED ABSENCE OF OTHER SPECIFIED PARTS OF DIGESTIVE TRACT: Chronic | ICD-10-CM

## 2021-11-17 DIAGNOSIS — C49.A0 GASTROINTESTINAL STROMAL TUMOR, UNSPECIFIED SITE: ICD-10-CM

## 2021-11-17 DIAGNOSIS — O03.9 COMPLETE OR UNSPECIFIED SPONTANEOUS ABORTION WITHOUT COMPLICATION: Chronic | ICD-10-CM

## 2021-11-22 ENCOUNTER — APPOINTMENT (OUTPATIENT)
Dept: INTERNAL MEDICINE | Facility: CLINIC | Age: 41
End: 2021-11-22

## 2021-11-22 ENCOUNTER — APPOINTMENT (OUTPATIENT)
Dept: HEMATOLOGY ONCOLOGY | Facility: CLINIC | Age: 41
End: 2021-11-22

## 2021-11-22 ENCOUNTER — LABORATORY RESULT (OUTPATIENT)
Age: 41
End: 2021-11-22

## 2021-11-24 NOTE — DISCUSSION/SUMMARY
[FreeTextEntry1] : \par REASON FOR CONSULT\par Ms. Connie Alvarado is a 41-year-old female referred by Dr. Grady Alexander for cancer genetic counseling and risk assessment due to personal history of GIST, family history of colon cancer, and her mother’s germline test results noting a pathogenic mutation in the MUTYH gene. Ms. Alvarado was seen on 2021 at which time medical, personal, and family history was ascertained and a pedigree constructed. Ms. Alvarado was unaccompanied. \par \par RELEVANT MEDICAL HISTORY\par Ms. Alvarado underwent an appendectomy on 2021. 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 Immunohistochemical analysis of the tumor was positive for KIT and DOG1. Genomic profiling of the tumor via a Mambu assay reported that microsatellite (MSI) status could not be determined, and mutations were detected in the KIT, MUTYH, and in the PDGFRA genes. Follow-up treatment includes surveillance scans every 6-12 months. \par \par OB/GYN HISTORY\par Ms. Alvarado reported being age 14 at menarche. She is  1 Para 0. She reported taking oral contraceptives from age [age] to 2010 due to PCOS. IUD (Mirena) use has begun in 2021. Her ovaries and uterus remain intact.    \par \par CANCER SCREENING HISTORY\par Breast Screening: \par •	Mammogram: None \par •	Breast Sonogram: None\par •	Breast MRI: None        \par \par Gynecological Screening: \par •	Pap smear: Frequency: Annually; Last: 2021; Results: Neg\par •	Transvaginal ultrasounds: None \par •	Serum CA-125: None  \par \par Colon Screening:\par •	Colonoscopy: Frequency: Every 5 years; Last: 10/2021; Results: reportedly, no polyps detected\par •	Alternatives (i.e. Cologuard): None   \par \par Dermatological Screening: \par •	Frequency: Annually; Last: 2021; Results: A benign lesion removed\par SOCIAL HISTORY\par •	Tobacco-product use: No\par •	Environmental exposures: No \par \par FAMILY HISTORY\par Maternal and paternal ancestry was reported as Palestinian, Trinidadian, English, Yakut and Polish. Ashkenazi Evangelical ancestry was denied. Consanguinity was denied. A detailed family history of cancer was ascertained, see below and scanned chart for pedigree. \par \par GENETIC TESTING IN FAMILY\par On 7/15/2021, BOO Naranjo, tested Ms. Alvarado’s mother for a multigene cancer panel (47 genes) through [facility]. \par \par Ms. Alvarado’s mother’s results: POSITIVE     \par •	A pathogenic variant, [id number] (p.[id number]), was detected in her MUTYH gene\par •	A variant of uncertain significance, c.8156G>A (p.[id number]), was detected in her JOSE ALBERTO gene\par \par Ms. Alvarado reported that she is unaware of anyone in her family, other than her mother, receiving genetic testing for cancer susceptibility.   \par 	\par 	RISK ASSESSMENT\par 	Ms. Alvarado’s personal history of a GIST is unlikely to be associated with the familial gastrointestinal stromal tumor given the presence of mutations in the KIT and PDGFRA genes. \par 	\par 	Ms. Alvarado’s mother carries A SINGLE pathogenic variant in the MUTYH gene. We discussed that individuals with a single pathogenic mutation in MUTYH may have a slightly increased risk to develop colorectal cancer compared to the general population risk of 4.2%. However, individuals with bi-allelic (two copies) MUTYH mutations, are known to have MUTYH-Associated Polyposis syndrome (MAP). MAP is characterized by adult-onset multiple colorectal adenomas and up to 80% chance to develop colorectal cancer by age 70.\par 	\par We reviewed the autosomal-dominant and autosomal-recessive inheritance pattern of MUTYH mutations. Ms. Alvarado was informed that given that her mother is a known MUTYH mutation carrier, she and each of her mother’s first-degree relatives has a one-in-two, or 50%, chance of carrying the familial MUTYH mutation. However, if Ms. Alvarado’s father also carries a MUTYH mutation, she has a one-in-four, or 25%, chance to inherit both MUTYH mutations, and have MAP. \par 	\par Regarding Ms. Alvarado’s mother’s variant of unknown significance in the JOSE ALBERTO gene, we discussed that it represents a genetic change whose significance is not yet well known and its contribution to cancer risk cannot be ascertained. Additional research may show that this variant is responsible for an increased risk of cancer or it may show that this variant is benign and is not associated with a higher risk of cancer. We discussed that a single PATHOGENIC variant in the JOSE ALBERTO gene is associated with an increased risk for female breast cancer, pancreatic cancer, and ovarian cancer. Bi-allelic JOSE ALBERTO mutations are associated with an autosomal-recessive condition called Ataxia-Telangiectasia. Ms. Alvarado was informed that at this time, it is NOT recommended that family members use this result for predictive genetic testing or medical management decisions.\par 	\par 	To clarify Ms. Alvarado’s risk, the option of testing for a panel of genes associated with hereditary cancer syndromes was discussed. Ms. Alvarado was informed that should her results reveal a pathogenic variant, increased cancer screening and risk-reducing options would be discussed.\par \par It was emphasized to Ms. Alvarado that her genetic risk assessment was based upon personal and biological relatives’ medical history as provided. We discussed the importance of cancer pathology and genetic test reports in the assessment of cancer syndromes. Current risk assessment may change in the future should new information be obtained. Therefore, it was recommended that she contact us if any additional information becomes available. \par 	\par GENETIC TESTING\par Given Ms. Alvarado’s personal history of an uncommon GIST and family history of cancer, testing for mutations in the MUTYH gene and for a panel of genes associated with GIST was recommended. This test analyzes the MUTYH, KIT, NF1, PDGFRA, SDHA, SDHB, SDHC, and SDHD genes.   \par \par The benefits, risks and limitations of genetic testing were discussed with Ms. Alvarado. In addition, we discussed the purpose of genetic testing and possible test results (positive, negative, inconclusive) along with associated medical management options. We reviewed genetic implications for family members along with psychosocial factors associated with genetic testing. Insurance coverage and potential out of pocket costs were also discussed. Ms. Alvarado was informed about the Genetic Information Non-discrimination Act ([place]) and the potential for genetic discrimination, and she was provided with written information regarding [place] and with a brochure from [employer].\par \par Following the discussion, Ms. Alvarado consented to pursue testing. She was taken to our laboratory for a blood draw. Her sample was sent today to [facility]. \par \par PLAN:  \par We will contact Ms. Alvarado to schedule a follow-up appointment when results are available. Results are expected in 2-3 weeks. Dr. Alexander will also receive a copy of the test results and follow-up note from our service.  Ms.  stated that she understands and agrees with this plan. \par \par Ms. Alvarado signed a medical release form to allow discussion of her genetics information with her , sister, and parents. This will be scanned into her chart.\par \par We remain available to Ms. Alvarado for any questions, comments, or concerns.\par \par Kanwal Tapia MS, [facility]\par Genetic Counselor, cancer Genetics\par \par Attachment: Pedigree\par \par CC/att: Grady Alexander M.D.

## 2021-11-30 ENCOUNTER — TRANSCRIPTION ENCOUNTER (OUTPATIENT)
Age: 41
End: 2021-11-30

## 2021-12-07 ENCOUNTER — TRANSCRIPTION ENCOUNTER (OUTPATIENT)
Age: 41
End: 2021-12-07

## 2021-12-08 ENCOUNTER — NON-APPOINTMENT (OUTPATIENT)
Age: 41
End: 2021-12-08

## 2021-12-22 NOTE — DISCUSSION/SUMMARY
[FreeTextEntry1] : \par REASON FOR VISIT\par Ms. Connie Alvarado is a 41-year-old female referred by Dr. Grady Alexander for cancer genetic counseling and risk assessment due to her personal history of GIST, family history of colon cancer, and her mother’s germline test results noting a pathogenic variant in her MUTYH gene. Ms. Alvarado was seen for cancer genetic counseling on November 22, 2021, at which time a personal and family history of cancer was obtained and germline genetic testing was ordered. On December 8, 2021, Ms. Alvarado was provided with her genetic test results over the phone.      \par \par TEST RESULTS \par Ms. Alvarado underwent germline testing for mutations in the MUTYH, KIT, NF1, PDGFRA, SDHA, SDHB, SDHC, and SDHD genes through [facility].  \par \par Her results revealed: CARRIER\par •	A single pathogenic variant, c.[id number] (p.[id number]), was detected in her MUTYH gene\par \par Of note, this is the familial MUTYH variant detected in her mother. \par \par RESULTS INTERPRETATION AND ASSESSMENT\par Ms. Alvarado was informed that she tested positive for A SINGLE pathogenic variant in the MUTYH gene.  These results are consistent with being a MUTYH carrier. \par \par Individuals with a SINGLE pathogenic mutation in MUTYH may have a slightly increased risk to develop colorectal cancer compared to the general population risk. Some studies have suggested an increased risk to develop breast cancer as well. However, these studies have not been confirmed in large, pan-ethnic studies, and therefore no changes in screening are recommended at this time.\par \par We discussed that individuals with bi-allelic (two copies) MUTYH mutations, are known to have MUTYH-Associated Polyposis syndrome (MAP). MAP is characterized by adult-onset multiple colorectal adenomas and increased risk of colorectal cancer and upper gastrointestinal tract cancers. \par \par IT WAS EMPHASIZED TO MS. ALVARADO THAT SHE WAS FOUND TO CARRY A SINGLE PATHOGENIC VARIANT IN HER MUTYH GENE. THUS, SHE DOES NOT HAVE MUTYH-ASSOCIATED POLYPOSIS SYNDROME.\par \par In addition, Ms. Alvarado was informed that she tested negative for pathogenic mutations in known susceptibility genes associated with GIST. We discussed that at this time, the cause of her GIST remains unknown. We discussed that although this genetic test results are reassuring; they do not entirely rule out the possibility of hereditary cancer risk as the GIST may have been associated with another cancer susceptibility gene not yet tested or identified, or with an uncommon abnormality in the genes tested that could not be detected due to limitations in technology, it may have developed randomly, or due to environmental factors. \par \par PATIENT MANAGEMENT IMPLICATIONS\par Given Ms. Alvarado’s personal history, her current reported family history of cancer, and her single MUTYH positive genetic test results, the following screening guidelines and risk-reducing recommendations were discussed:\par \par COLON:\par Unaffected MUTYH carriers with a first-degree relative with colorectal cancer are recommended to undergo colonoscopy screening every 5 years, beginning at age 40 or 10 years prior to age of first-degree relative’s age at colorectal diagnosis. Thus, it was recommended that Ms. Alvarado start screening colonoscopies at age 40. The appropriate screening interval should be discussed with her treating gastroenterologist.\par \par OTHER:\par In the absence of other indications, Ms. Alvarado should practice age-appropriate cancer screening of other organ systems as recommended for the general population.\par \par IMPLICATIONS FOR FAMILY MEMBERS \par We discussed the implications of Ms. Alvarado’s test results for her family members in detail. We discussed that her relatives may be at risk for carrying one or two MUTYH mutations and thus, genetic counseling and consideration for testing for her sister and maternal aunt was recommended. Ms. Alvarado was informed that although her results show that she does not have MAP, her family members may be at risk for it.\par \par We discussed that:\par CHILDREN: Future children have a 50% chance to carry the familial MUTYH mutation. If their other parent also carries an MUTYH mutation, then each child has a 25% chance to have two MUTYH mutations and thus, MAP. In addition, given that biallelic mutations in the MUTYH gene are associated with MAP, consideration should be given to carrier testing a reproductive-partner, if it would inform reproductive decision-making and/or risk assessment and management. In addition, reproductive options such as prenatal testing and assisted reproduction, including preimplantation genetic diagnosis (PGD), may be considered. \par \par SIBLING: Ms. Alvarado’s sister has a one-in-two (or 50%) chance of carrying the familial MUTYH mutation. However, she may be a carrier of two MUTYH mutations. \par \par OTHER RELATIVES: Ms. Alvarado’s maternal aunt has a one-in-two (or 50%) chance of carrying the familial MUTYH mutation. However, she may be a carrier of two MUTYH mutations.\par \par To further clarify the risk for Ms. Alvarado’s at-risk relatives, genetic counseling and consideration of testing was recommended. If an at-risk relative is unavailable or unwilling to be tested, the option of testing more distant relatives should be discussed. The importance of sharing this information with all relatives was strongly emphasized.\par \par To locate a genetic counselor, Mr. Alvarado’s relatives may refer to the National Society of Genetic Counselors (https://www.nsgc.org/findageneticcounselor ) or to the National Cancer [place] [facility] Services Directory (http://www.cancer.gov/cancertopics/genetics/directory) and enter the city, state and country in the "location" section of the search tool. \par \par SUMMARY & PLAN\par See above note for recommended management.\par \par We encourage sharing these results with family members. They have a risk to have inherited the same mutation and should contact a certified genetic counselor specializing in cancer. Due to HIPAA and [place] laws, we are unable to directly contact other family at risk, but we are available should family members wish to reach out to us.\par \par A copy of Ms. Alvarado’s genetic test results and consult note will be sent to her and to Dr. Alexander.  \par \par Genetic knowledge changes rapidly. We encouraged re-contacting Cancer Genetics every 2-3 years for any changes in screening recommendations or sooner if there are significant changes in personal or family history.\par \par For any additional questions please call Cancer Genetics at [phone]. \par \par Kanwal Tapia MS, [facility]\par Certified Genetic Counselor\par \par Attachments:  Pedigree \par \par cc: Grady Alexander M.D.\par       Ms. Connie Alvarado\par

## 2022-02-07 ENCOUNTER — OUTPATIENT (OUTPATIENT)
Dept: OUTPATIENT SERVICES | Facility: HOSPITAL | Age: 42
LOS: 1 days | Discharge: HOME | End: 2022-02-07

## 2022-02-07 DIAGNOSIS — Z90.49 ACQUIRED ABSENCE OF OTHER SPECIFIED PARTS OF DIGESTIVE TRACT: Chronic | ICD-10-CM

## 2022-02-07 DIAGNOSIS — O03.9 COMPLETE OR UNSPECIFIED SPONTANEOUS ABORTION WITHOUT COMPLICATION: Chronic | ICD-10-CM

## 2022-02-07 DIAGNOSIS — Z98.890 OTHER SPECIFIED POSTPROCEDURAL STATES: Chronic | ICD-10-CM

## 2022-02-07 DIAGNOSIS — C49.A0 GASTROINTESTINAL STROMAL TUMOR, UNSPECIFIED SITE: ICD-10-CM

## 2022-02-09 ENCOUNTER — APPOINTMENT (OUTPATIENT)
Dept: HEMATOLOGY ONCOLOGY | Facility: CLINIC | Age: 42
End: 2022-02-09

## 2022-04-06 ENCOUNTER — APPOINTMENT (OUTPATIENT)
Dept: INTERNAL MEDICINE | Facility: CLINIC | Age: 42
End: 2022-04-06
Payer: COMMERCIAL

## 2022-04-06 VITALS
WEIGHT: 174 LBS | HEIGHT: 66 IN | DIASTOLIC BLOOD PRESSURE: 68 MMHG | OXYGEN SATURATION: 98 % | TEMPERATURE: 94.28 F | SYSTOLIC BLOOD PRESSURE: 126 MMHG | BODY MASS INDEX: 27.97 KG/M2 | HEART RATE: 65 BPM

## 2022-04-06 PROCEDURE — 99395 PREV VISIT EST AGE 18-39: CPT | Mod: 25

## 2022-04-06 PROCEDURE — G0444 DEPRESSION SCREEN ANNUAL: CPT | Mod: 59

## 2022-04-06 RX ORDER — LORAZEPAM 0.5 MG/1
0.5 TABLET ORAL
Refills: 0 | Status: DISCONTINUED | COMMUNITY
End: 2022-04-06

## 2022-04-06 RX ORDER — CYANOCOBALAMIN (VITAMIN B-12) 5000 MCG
5000 TABLET,DISINTEGRATING ORAL
Refills: 0 | Status: DISCONTINUED | COMMUNITY
End: 2022-04-06

## 2022-04-06 RX ORDER — CA/D3/MAG OX/ZINC/COP/MANG/BOR 600 MG-800
250 MCG TABLET,CHEWABLE ORAL
Refills: 0 | Status: DISCONTINUED | COMMUNITY
End: 2022-04-06

## 2022-04-06 RX ORDER — VIT C/ZN GLUC/HERBAL NO.325 90 MG-15MG
LOZENGE MUCOUS MEMBRANE
Refills: 0 | Status: DISCONTINUED | COMMUNITY
End: 2022-04-06

## 2022-04-06 NOTE — PHYSICAL EXAM
[Well Nourished] : well nourished [Well Developed] : well developed [Well-Appearing] : well-appearing [Normal Sclera/Conjunctiva] : normal sclera/conjunctiva [PERRL] : pupils equal round and reactive to light [EOMI] : extraocular movements intact [Normal Outer Ear/Nose] : the outer ears and nose were normal in appearance [Normal Oropharynx] : the oropharynx was normal [Normal TMs] : both tympanic membranes were normal [Normal Nasal Mucosa] : the nasal mucosa was normal [No Lymphadenopathy] : no lymphadenopathy [Supple] : supple [Thyroid Normal, No Nodules] : the thyroid was normal and there were no nodules present [No Respiratory Distress] : no respiratory distress  [No Accessory Muscle Use] : no accessory muscle use [Clear to Auscultation] : lungs were clear to auscultation bilaterally [Normal Rate] : normal rate  [Regular Rhythm] : with a regular rhythm [Normal S1, S2] : normal S1 and S2 [No Murmur] : no murmur heard [No Carotid Bruits] : no carotid bruits [No Edema] : there was no peripheral edema [Normal Appearance] : normal in appearance [No Nipple Discharge] : no nipple discharge [No Axillary Lymphadenopathy] : no axillary lymphadenopathy [Soft] : abdomen soft [Non Tender] : non-tender [Non-distended] : non-distended [No Masses] : no abdominal mass palpated [Normal Bowel Sounds] : normal bowel sounds [Normal Supraclavicular Nodes] : no supraclavicular lymphadenopathy [Normal Axillary Nodes] : no axillary lymphadenopathy [Normal Posterior Cervical Nodes] : no posterior cervical lymphadenopathy [Normal Anterior Cervical Nodes] : no anterior cervical lymphadenopathy [No CVA Tenderness] : no CVA  tenderness [Coordination Grossly Intact] : coordination grossly intact [No Focal Deficits] : no focal deficits [Normal Gait] : normal gait [Deep Tendon Reflexes (DTR)] : deep tendon reflexes were 2+ and symmetric [Speech Grossly Normal] : speech grossly normal [Memory Grossly Normal] : memory grossly normal [Normal Affect] : the affect was normal [Alert and Oriented x3] : oriented to person, place, and time [Normal Mood] : the mood was normal [Normal Insight/Judgement] : insight and judgment were intact [No Acute Distress] : no acute distress

## 2022-04-06 NOTE — HEALTH RISK ASSESSMENT
[Good] : ~his/her~  mood as  good [Yes] : Yes [Monthly or less (1 pt)] : Monthly or less (1 point) [No] : In the past 12 months have you used drugs other than those required for medical reasons? No [No falls in past year] : Patient reported no falls in the past year [None] : None [With Family] : lives with family [Employed] : employed [Feels Safe at Home] : Feels safe at home [Fully functional (bathing, dressing, toileting, transferring, walking, feeding)] : Fully functional (bathing, dressing, toileting, transferring, walking, feeding) [Fully functional (using the telephone, shopping, preparing meals, housekeeping, doing laundry, using] : Fully functional and needs no help or supervision to perform IADLs (using the telephone, shopping, preparing meals, housekeeping, doing laundry, using transportation, managing medications and managing finances) [Smoke Detector] : smoke detector [Carbon Monoxide Detector] : carbon monoxide detector [Seat Belt] :  uses seat belt [de-identified] : active [de-identified] : regular [Reports changes in hearing] : Reports no changes in hearing [Reports changes in vision] : Reports no changes in vision [Reports changes in dental health] : Reports no changes in dental health

## 2022-04-06 NOTE — HISTORY OF PRESENT ILLNESS
[FreeTextEntry1] : Annual Physical [de-identified] : SINGH MCCORD is a 40 yo woman with PCOS, anxiety, GIST tumor s/p resection 2021 here for a physical. She has been well overall.  Seeing [facility] onc Dr Hinojosa yearly.  Anxiety stable with no meds.  Doing well overall\par \par Gyn, derm due. Tdap utd.  Had Moderna booster.\par \par The patient is  with one adopted son.  She works in fundraising.  She would have no difficulty walking 4 to 6 blocks or 2 flights of stairs.

## 2022-04-06 NOTE — ASSESSMENT
[FreeTextEntry1] : HCM\par Labs ordered\par Advised gyn, baseline mammogram and breast u/s\par Advised derm\par Tdap utd\par f/u prn or 1 year

## 2022-04-14 LAB
25(OH)D3 SERPL-MCNC: 31.8 NG/ML
ALBUMIN SERPL ELPH-MCNC: 4.9 G/DL
ALP BLD-CCNC: 96 U/L
ALT SERPL-CCNC: 10 U/L
ANION GAP SERPL CALC-SCNC: 16 MMOL/L
APPEARANCE: CLEAR
AST SERPL-CCNC: 15 U/L
BASOPHILS # BLD AUTO: 0.03 K/UL
BASOPHILS NFR BLD AUTO: 0.4 %
BILIRUB SERPL-MCNC: 0.6 MG/DL
BILIRUBIN URINE: NEGATIVE
BLOOD URINE: NEGATIVE
BUN SERPL-MCNC: 12 MG/DL
CALCIUM SERPL-MCNC: 9.7 MG/DL
CHLORIDE SERPL-SCNC: 100 MMOL/L
CHOLEST SERPL-MCNC: 187 MG/DL
CO2 SERPL-SCNC: 22 MMOL/L
COLOR: COLORLESS
COVID-19 NUCLEOCAPSID  GAM ANTIBODY INTERPRETATION: NEGATIVE
CREAT SERPL-MCNC: 0.73 MG/DL
EGFR: 107 ML/MIN/1.73M2
EOSINOPHIL # BLD AUTO: 0.12 K/UL
EOSINOPHIL NFR BLD AUTO: 1.5 %
ESTIMATED AVERAGE GLUCOSE: 100 MG/DL
GLUCOSE QUALITATIVE U: NEGATIVE
GLUCOSE SERPL-MCNC: 67 MG/DL
HBA1C MFR BLD HPLC: 5.1 %
HCT VFR BLD CALC: 45.3 %
HDLC SERPL-MCNC: 70 MG/DL
HGB BLD-MCNC: 14.9 G/DL
IMM GRANULOCYTES NFR BLD AUTO: 0.4 %
KETONES URINE: NEGATIVE
LDLC SERPL CALC-MCNC: 106 MG/DL
LEUKOCYTE ESTERASE URINE: NEGATIVE
LYMPHOCYTES # BLD AUTO: 1.6 K/UL
LYMPHOCYTES NFR BLD AUTO: 20 %
MAN DIFF?: NORMAL
MCHC RBC-ENTMCNC: 29.3 PG
MCHC RBC-ENTMCNC: 32.9 GM/DL
MCV RBC AUTO: 89.2 FL
MONOCYTES # BLD AUTO: 0.45 K/UL
MONOCYTES NFR BLD AUTO: 5.6 %
NEUTROPHILS # BLD AUTO: 5.79 K/UL
NEUTROPHILS NFR BLD AUTO: 72.1 %
NITRITE URINE: NEGATIVE
NONHDLC SERPL-MCNC: 117 MG/DL
PH URINE: 7.5
PLATELET # BLD AUTO: 319 K/UL
POTASSIUM SERPL-SCNC: 4.7 MMOL/L
PROT SERPL-MCNC: 7.1 G/DL
PROTEIN URINE: NEGATIVE
RBC # BLD: 5.08 M/UL
RBC # FLD: 13.1 %
SARS-COV-2 AB SERPL QL IA: 0.07 INDEX
SODIUM SERPL-SCNC: 138 MMOL/L
SPECIFIC GRAVITY URINE: 1
T4 FREE SERPL-MCNC: 1.3 NG/DL
TRIGL SERPL-MCNC: 52 MG/DL
TSH SERPL-ACNC: 0.73 UIU/ML
UROBILINOGEN URINE: NORMAL
VIT B12 SERPL-MCNC: >2000 PG/ML
WBC # FLD AUTO: 8.02 K/UL

## 2022-05-17 ENCOUNTER — APPOINTMENT (OUTPATIENT)
Dept: ULTRASOUND IMAGING | Facility: CLINIC | Age: 42
End: 2022-05-17
Payer: COMMERCIAL

## 2022-05-17 ENCOUNTER — RESULT REVIEW (OUTPATIENT)
Age: 42
End: 2022-05-17

## 2022-05-17 ENCOUNTER — APPOINTMENT (OUTPATIENT)
Dept: MAMMOGRAPHY | Facility: CLINIC | Age: 42
End: 2022-05-17
Payer: COMMERCIAL

## 2022-05-17 PROCEDURE — 76641 ULTRASOUND BREAST COMPLETE: CPT | Mod: RT

## 2022-05-17 PROCEDURE — 77063 BREAST TOMOSYNTHESIS BI: CPT

## 2022-05-17 PROCEDURE — 77067 SCR MAMMO BI INCL CAD: CPT

## 2022-06-16 ENCOUNTER — APPOINTMENT (OUTPATIENT)
Dept: OBGYN | Facility: CLINIC | Age: 42
End: 2022-06-16

## 2022-07-06 ENCOUNTER — APPOINTMENT (OUTPATIENT)
Dept: OBGYN | Facility: CLINIC | Age: 42
End: 2022-07-06

## 2022-07-06 ENCOUNTER — TRANSCRIPTION ENCOUNTER (OUTPATIENT)
Age: 42
End: 2022-07-06

## 2022-07-22 NOTE — ED CDU PROVIDER DISPOSITION NOTE - CLINICAL COURSE
37 y/o female with PMHx PCOS and migraine headaches presents to the ED c/o right sided flank pain and right lower quadrant abdominal pain x 2 days. Pt states symptoms initially started with lower abdominal pain when she was on table for MRI (outpatient neuro workup for migraine headaches,), pain was initially 5/10, stabbing in quality no worse w/ movement. Since then, abdominal pain has improved but she developed more R flank pain with fever/chills (tmax 100.1 today), additionally had nausea and 2 episodes of nb/nb vomiting yesterday. Mild decreased appetite. Recently returned from [place] 10 days. Of note pt just finished steroid taper for dizziness 2/2 presumed inner ear viral infx by her ENT. Went to GI Dr. Lott this AM regarding symptoms, sent pt to ED. Denies recent sick contacts, urinary urgency, dysuria, frequency, chest pain, shortness of breath, hematuria, vaginal bleeding/discharge, diarrhea, dizziness/light-headedness, numbness/tingling, rash, bite wounds, joint pain.  In ED, WBC 20.3, Na 130, AST 77, , lactate 3.1, UA negative, CT a/p showed decreased attenuation of R renal cortex with perinephric stranding. pt given IVFs, repeat lactate 1.0. pt started on IV cefotetan and sent to CDU for continued IV antibiotics (ceftriaxone).   In CDU, ___________ 39 y/o female with PMHx PCOS and migraine headaches presents to the ED c/o right sided flank pain and right lower quadrant abdominal pain x 2 days. Pt states symptoms initially started with lower abdominal pain when she was on table for MRI (outpatient neuro workup for migraine headaches,), pain was initially 5/10, stabbing in quality no worse w/ movement. Since then, abdominal pain has improved but she developed more R flank pain with fever/chills (tmax 100.1 today), additionally had nausea and 2 episodes of nb/nb vomiting yesterday. Mild decreased appetite. Recently returned from [place] 10 days. Of note pt just finished steroid taper for dizziness 2/2 presumed inner ear viral infx by her ENT. Went to GI Dr. Lott this AM regarding symptoms, sent pt to ED. Denies recent sick contacts, urinary urgency, dysuria, frequency, chest pain, shortness of breath, hematuria, vaginal bleeding/discharge, diarrhea, dizziness/light-headedness, numbness/tingling, rash, bite wounds, joint pain.  In ED, WBC 20.3, Na 130, AST 77, , lactate 3.1, UA negative, CT a/p showed decreased attenuation of R renal cortex with perinephric stranding. pt given IVFs, repeat lactate 1.0. pt started on IV cefotetan and sent to CDU for continued IV antibiotics (ceftriaxone).   In CDU, patient responded well to ceftriaxone.  Flank pain improved.  Patient remained afebrile throughout the day.  Patient medically stable for discharge home with cefpodoxime and close outpatient follow up. Former smoker

## 2022-09-07 ENCOUNTER — NON-APPOINTMENT (OUTPATIENT)
Age: 42
End: 2022-09-07

## 2022-09-07 ENCOUNTER — TRANSCRIPTION ENCOUNTER (OUTPATIENT)
Age: 42
End: 2022-09-07

## 2022-11-14 NOTE — ED CDU PROVIDER SUBSEQUENT DAY NOTE - DATE/TIME 1
CONTROL REFILL REQUEST:  LAST OV:8/15/2022  NEXT OV:11/28/2022  LAST UDS:3/01/2021  LAST CONTRACT: NOT ON FILE    
30-Mar-2019 04:31

## 2022-11-28 ENCOUNTER — NON-APPOINTMENT (OUTPATIENT)
Age: 42
End: 2022-11-28

## 2023-04-06 ENCOUNTER — NON-APPOINTMENT (OUTPATIENT)
Age: 43
End: 2023-04-06

## 2023-04-06 ENCOUNTER — APPOINTMENT (OUTPATIENT)
Dept: INTERNAL MEDICINE | Facility: CLINIC | Age: 43
End: 2023-04-06
Payer: COMMERCIAL

## 2023-04-06 VITALS
BODY MASS INDEX: 27.97 KG/M2 | DIASTOLIC BLOOD PRESSURE: 72 MMHG | RESPIRATION RATE: 16 BRPM | HEART RATE: 73 BPM | HEIGHT: 66 IN | OXYGEN SATURATION: 98 % | WEIGHT: 174 LBS | SYSTOLIC BLOOD PRESSURE: 126 MMHG | TEMPERATURE: 97.3 F

## 2023-04-06 PROCEDURE — G0444 DEPRESSION SCREEN ANNUAL: CPT | Mod: 59

## 2023-04-06 PROCEDURE — 99396 PREV VISIT EST AGE 40-64: CPT | Mod: 25

## 2023-04-06 PROCEDURE — 93000 ELECTROCARDIOGRAM COMPLETE: CPT | Mod: 59

## 2023-04-06 RX ORDER — MAGNESIUM OXIDE 500 MG
500 TABLET ORAL DAILY
Refills: 0 | Status: DISCONTINUED | COMMUNITY
End: 2023-04-06

## 2023-04-06 NOTE — HEALTH RISK ASSESSMENT
[Good] : ~his/her~  mood as  good [Yes] : Yes [Monthly or less (1 pt)] : Monthly or less (1 point) [No] : In the past 12 months have you used drugs other than those required for medical reasons? No [No falls in past year] : Patient reported no falls in the past year [None] : None [With Family] : lives with family [Employed] : employed [Feels Safe at Home] : Feels safe at home [Fully functional (bathing, dressing, toileting, transferring, walking, feeding)] : Fully functional (bathing, dressing, toileting, transferring, walking, feeding) [Fully functional (using the telephone, shopping, preparing meals, housekeeping, doing laundry, using] : Fully functional and needs no help or supervision to perform IADLs (using the telephone, shopping, preparing meals, housekeeping, doing laundry, using transportation, managing medications and managing finances) [Smoke Detector] : smoke detector [Carbon Monoxide Detector] : carbon monoxide detector [Seat Belt] :  uses seat belt [Never] : Never [de-identified] : active [de-identified] : regular [Reports changes in hearing] : Reports no changes in hearing [Reports changes in vision] : Reports no changes in vision [Reports changes in dental health] : Reports no changes in dental health

## 2023-04-06 NOTE — ASSESSMENT
[FreeTextEntry1] : HCM\par Labs utd-brought in by pt\par Advised gyn, mammogram and breast u/s\par Advised derm\par Tdap utd\par f/u prn or 1 year

## 2023-04-06 NOTE — PHYSICAL EXAM
[No Acute Distress] : no acute distress [Well Nourished] : well nourished [Well Developed] : well developed [Well-Appearing] : well-appearing [Normal Sclera/Conjunctiva] : normal sclera/conjunctiva [PERRL] : pupils equal round and reactive to light [EOMI] : extraocular movements intact [Normal Outer Ear/Nose] : the outer ears and nose were normal in appearance [Normal Oropharynx] : the oropharynx was normal [Normal TMs] : both tympanic membranes were normal [Normal Nasal Mucosa] : the nasal mucosa was normal [No Lymphadenopathy] : no lymphadenopathy [Supple] : supple [Thyroid Normal, No Nodules] : the thyroid was normal and there were no nodules present [No Respiratory Distress] : no respiratory distress  [No Accessory Muscle Use] : no accessory muscle use [Clear to Auscultation] : lungs were clear to auscultation bilaterally [Normal Rate] : normal rate  [Regular Rhythm] : with a regular rhythm [Normal S1, S2] : normal S1 and S2 [No Murmur] : no murmur heard [No Carotid Bruits] : no carotid bruits [No Edema] : there was no peripheral edema [Normal Appearance] : normal in appearance [No Nipple Discharge] : no nipple discharge [No Axillary Lymphadenopathy] : no axillary lymphadenopathy [Soft] : abdomen soft [Non Tender] : non-tender [Non-distended] : non-distended [No Masses] : no abdominal mass palpated [Normal Bowel Sounds] : normal bowel sounds [No CVA Tenderness] : no CVA  tenderness [Coordination Grossly Intact] : coordination grossly intact [No Focal Deficits] : no focal deficits [Normal Gait] : normal gait [Deep Tendon Reflexes (DTR)] : deep tendon reflexes were 2+ and symmetric [Speech Grossly Normal] : speech grossly normal [Memory Grossly Normal] : memory grossly normal [Normal Affect] : the affect was normal [Alert and Oriented x3] : oriented to person, place, and time [Normal Mood] : the mood was normal [Normal Insight/Judgement] : insight and judgment were intact [de-identified] : dense

## 2023-04-06 NOTE — HISTORY OF PRESENT ILLNESS
[FreeTextEntry1] : Annual Physical [de-identified] : SINGH MCCORD is a 43 yo woman with PCOS, anxiety, GIST tumor s/p resection 2021 here for a physical. She has been well overall.  Seeing [facility] onc Dr Hinojosa yearly.    Doing well overall\par \par Gyn(due 9/23), mammogram, derm due. Tdap utd.  Seeing nutritionist\par \par The patient is  with one adopted son.  She works in [employer].  She would have no difficulty walking 4 to 6 blocks or 2 flights of stairs.

## 2023-05-18 ENCOUNTER — RESULT REVIEW (OUTPATIENT)
Age: 43
End: 2023-05-18

## 2023-05-18 ENCOUNTER — APPOINTMENT (OUTPATIENT)
Dept: ULTRASOUND IMAGING | Facility: CLINIC | Age: 43
End: 2023-05-18
Payer: COMMERCIAL

## 2023-05-18 ENCOUNTER — APPOINTMENT (OUTPATIENT)
Dept: MAMMOGRAPHY | Facility: CLINIC | Age: 43
End: 2023-05-18
Payer: COMMERCIAL

## 2023-05-18 PROCEDURE — 76641 ULTRASOUND BREAST COMPLETE: CPT | Mod: LT

## 2023-05-18 PROCEDURE — 77067 SCR MAMMO BI INCL CAD: CPT

## 2023-05-18 PROCEDURE — 77063 BREAST TOMOSYNTHESIS BI: CPT

## 2023-05-19 ENCOUNTER — TRANSCRIPTION ENCOUNTER (OUTPATIENT)
Age: 43
End: 2023-05-19

## 2023-06-28 ENCOUNTER — TRANSCRIPTION ENCOUNTER (OUTPATIENT)
Age: 43
End: 2023-06-28

## 2023-08-21 ENCOUNTER — APPOINTMENT (OUTPATIENT)
Dept: DERMATOLOGY | Facility: CLINIC | Age: 43
End: 2023-08-21

## 2023-08-30 ENCOUNTER — APPOINTMENT (OUTPATIENT)
Dept: DERMATOLOGY | Facility: CLINIC | Age: 43
End: 2023-08-30
Payer: COMMERCIAL

## 2023-08-30 DIAGNOSIS — Z12.83 ENCOUNTER FOR SCREENING FOR MALIGNANT NEOPLASM OF SKIN: ICD-10-CM

## 2023-08-30 DIAGNOSIS — D22.9 MELANOCYTIC NEVI, UNSPECIFIED: ICD-10-CM

## 2023-08-30 DIAGNOSIS — L50.3 DERMATOGRAPHIC URTICARIA: ICD-10-CM

## 2023-08-30 PROCEDURE — 99213 OFFICE O/P EST LOW 20 MIN: CPT

## 2023-08-31 PROBLEM — L50.3 DERMATOGRAPHISM: Status: ACTIVE | Noted: 2023-08-31

## 2023-08-31 PROBLEM — D22.9 MULTIPLE BENIGN MELANOCYTIC NEVI: Status: ACTIVE | Noted: 2023-08-31

## 2023-08-31 PROBLEM — Z12.83 SCREENING EXAM FOR SKIN CANCER: Status: ACTIVE | Noted: 2023-08-31

## 2023-08-31 NOTE — PHYSICAL EXAM
[FreeTextEntry3] : AAOx3, NAD, well-appearing / pleasant, no visual lymphadenopathy, no clubbing / edema, no chromhidrosis / bromhidrosis  Examination of scalp/hair, head/face, conjunctiva/eyelids, neck, lips/tooth/gums, chest/breast/axilla, back, abdomen, groin/buttocks, R up extremity, L up extremity, R low extremity, L low extremity, digits/nails within normal limits with the exception of:  - L abd w/ brown dome shaped papule with benign dermoscopic appearance  - R posterior buttocks w/ well healed scar at site of prior bx  - Fairly uniform and regular brown macules and papules on the trunk and extremities - + 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

## 2023-08-31 NOTE — ASSESSMENT
[FreeTextEntry1] : 1. Dermatographism - Discussed nature and course - Recommend daily OTC Zyrtec, If no improvement can go up to 2x/day and then 3x/day. SED.  2. Multiple benign nevi 3. Screening exam for skin cancer  Full body exam today. No concerning lesions for melanoma or NMSC clinically or under dermoscopy on today's exam. Benign findings as above. - Patient educated on ABCDEs of melanoma screening  - Recommend self skin exam monthly, annual exam by MD - Photoprotection reviewed including sun-protective behaviors, protective clothing, and the use of broad-spectrum SPF 30+ sunscreens was advised  - RTC if develops lesions that are new, symptomatic (bleeding, itching), changing in size/color/shape or bleeding  RTC yearly

## 2023-08-31 NOTE — HISTORY OF PRESENT ILLNESS
[FreeTextEntry1] : rp: bumps on skin [de-identified] : 43 yo F with PMH PCOS on metformin here for mole check. Points out a brown birthmark on the L abd that has not changed, previously reassured about benign nature but concerned as it looks different than remaining moles. No other spots that are changing, growing, or otherwise symptomatic. Did have a mole removed on the R posterior buttocks several yrs ago, benign.   Also with itchy red bumps that come and go, worse in the summer. Appear to improve when patient is taking Zyrtec.  No personal hx skin cancer. FHx +SCC in maternal grandfather No hx blistering sunburns or tanning bed use

## 2023-09-14 NOTE — ED ADULT NURSE NOTE - RESPIRATORY ASSESSMENT
- - - Hydroquinone Counseling:  Patient advised that medication may result in skin irritation, lightening (hypopigmentation), dryness, and burning.  In the event of skin irritation, the patient was advised to reduce the amount of the drug applied or use it less frequently.  Rarely, spots that are treated with hydroquinone can become darker (pseudoochronosis).  Should this occur, patient instructed to stop medication and call the office. The patient verbalized understanding of the proper use and possible adverse effects of hydroquinone.  All of the patient's questions and concerns were addressed.

## 2024-01-12 ENCOUNTER — TRANSCRIPTION ENCOUNTER (OUTPATIENT)
Age: 44
End: 2024-01-12

## 2024-01-16 ENCOUNTER — TRANSCRIPTION ENCOUNTER (OUTPATIENT)
Age: 44
End: 2024-01-16

## 2024-04-09 ENCOUNTER — APPOINTMENT (OUTPATIENT)
Dept: DERMATOLOGY | Facility: CLINIC | Age: 44
End: 2024-04-09

## 2024-04-24 ENCOUNTER — APPOINTMENT (OUTPATIENT)
Dept: INTERNAL MEDICINE | Facility: CLINIC | Age: 44
End: 2024-04-24
Payer: COMMERCIAL

## 2024-04-24 ENCOUNTER — NON-APPOINTMENT (OUTPATIENT)
Age: 44
End: 2024-04-24

## 2024-04-24 VITALS
OXYGEN SATURATION: 98 % | WEIGHT: 171 LBS | DIASTOLIC BLOOD PRESSURE: 70 MMHG | BODY MASS INDEX: 27.48 KG/M2 | HEIGHT: 66 IN | HEART RATE: 77 BPM | TEMPERATURE: 98.4 F | SYSTOLIC BLOOD PRESSURE: 116 MMHG

## 2024-04-24 DIAGNOSIS — D21.4 BENIGN NEOPLASM OF CONNECTIVE AND OTHER SOFT TISSUE OF ABDOMEN: ICD-10-CM

## 2024-04-24 DIAGNOSIS — Z00.00 ENCOUNTER FOR GENERAL ADULT MEDICAL EXAMINATION W/OUT ABNORMAL FINDINGS: ICD-10-CM

## 2024-04-24 PROCEDURE — 99396 PREV VISIT EST AGE 40-64: CPT

## 2024-04-24 PROCEDURE — 93000 ELECTROCARDIOGRAM COMPLETE: CPT

## 2024-04-24 RX ORDER — BENZONATATE 100 MG/1
100 CAPSULE ORAL 3 TIMES DAILY
Qty: 30 | Refills: 0 | Status: DISCONTINUED | COMMUNITY
Start: 2022-09-07 | End: 2024-04-24

## 2024-04-24 NOTE — HISTORY OF PRESENT ILLNESS
[FreeTextEntry1] : Annual Physical [de-identified] : SINGH MCCORD is a 42 yo woman with PCOS, anxiety, GIST tumor s/p resection 2021 here for a physical. She has been well overall.  Seeing [facility] onc Dr Hinojosa yearly, MRI liver showed liver cyst.    Doing well overall  Gyn, mammogram, breast MRI utd dr oppenheim.  Colonoscopy utd 2021 due 2026.  derm due. Tdap utd.    The patient is  with one adopted son.  She works in [employer].  She would have no difficulty walking 4 to 6 blocks or 2 flights of stairs.

## 2024-04-24 NOTE — HEALTH RISK ASSESSMENT
[Good] : ~his/her~  mood as  good [Yes] : Yes [Monthly or less (1 pt)] : Monthly or less (1 point) [No] : In the past 12 months have you used drugs other than those required for medical reasons? No [No falls in past year] : Patient reported no falls in the past year [None] : None [With Family] : lives with family [Employed] : employed [Feels Safe at Home] : Feels safe at home [Fully functional (bathing, dressing, toileting, transferring, walking, feeding)] : Fully functional (bathing, dressing, toileting, transferring, walking, feeding) [Fully functional (using the telephone, shopping, preparing meals, housekeeping, doing laundry, using] : Fully functional and needs no help or supervision to perform IADLs (using the telephone, shopping, preparing meals, housekeeping, doing laundry, using transportation, managing medications and managing finances) [Smoke Detector] : smoke detector [Carbon Monoxide Detector] : carbon monoxide detector [Seat Belt] :  uses seat belt [Never] : Never [de-identified] : active [de-identified] : regular [Reports changes in hearing] : Reports no changes in hearing [Reports changes in vision] : Reports no changes in vision [Reports changes in dental health] : Reports no changes in dental health

## 2024-04-24 NOTE — PHYSICAL EXAM
[No Acute Distress] : no acute distress [Well Nourished] : well nourished [Well Developed] : well developed [Well-Appearing] : well-appearing [Normal Sclera/Conjunctiva] : normal sclera/conjunctiva [PERRL] : pupils equal round and reactive to light [EOMI] : extraocular movements intact [Normal Outer Ear/Nose] : the outer ears and nose were normal in appearance [Normal Oropharynx] : the oropharynx was normal [Normal TMs] : both tympanic membranes were normal [Normal Nasal Mucosa] : the nasal mucosa was normal [No Lymphadenopathy] : no lymphadenopathy [Supple] : supple [Thyroid Normal, No Nodules] : the thyroid was normal and there were no nodules present [No Respiratory Distress] : no respiratory distress  [No Accessory Muscle Use] : no accessory muscle use [Clear to Auscultation] : lungs were clear to auscultation bilaterally [Normal Rate] : normal rate  [Regular Rhythm] : with a regular rhythm [Normal S1, S2] : normal S1 and S2 [No Murmur] : no murmur heard [No Carotid Bruits] : no carotid bruits [No Edema] : there was no peripheral edema [Normal Appearance] : normal in appearance [No Nipple Discharge] : no nipple discharge [No Axillary Lymphadenopathy] : no axillary lymphadenopathy [Soft] : abdomen soft [Non Tender] : non-tender [Non-distended] : non-distended [No Masses] : no abdominal mass palpated [Normal Bowel Sounds] : normal bowel sounds [No CVA Tenderness] : no CVA  tenderness [Coordination Grossly Intact] : coordination grossly intact [No Focal Deficits] : no focal deficits [Normal Gait] : normal gait [Deep Tendon Reflexes (DTR)] : deep tendon reflexes were 2+ and symmetric [Speech Grossly Normal] : speech grossly normal [Memory Grossly Normal] : memory grossly normal [Normal Affect] : the affect was normal [Alert and Oriented x3] : oriented to person, place, and time [Normal Mood] : the mood was normal [Normal Insight/Judgement] : insight and judgment were intact [de-identified] : dense

## 2024-04-24 NOTE — ASSESSMENT
[FreeTextEntry1] : [facility] utd-brought in by pt gyn, mammogram and breast u/s utd Derm utd Tdap utd f/u prn or 1 year

## 2024-04-25 LAB
APPEARANCE: CLEAR
BILIRUBIN URINE: NEGATIVE
BLOOD URINE: NEGATIVE
COLOR: YELLOW
GLUCOSE QUALITATIVE U: NEGATIVE MG/DL
KETONES URINE: NEGATIVE MG/DL
LEUKOCYTE ESTERASE URINE: NEGATIVE
NITRITE URINE: NEGATIVE
PH URINE: 8
PROTEIN URINE: NEGATIVE MG/DL
SPECIFIC GRAVITY URINE: <1.005
UROBILINOGEN URINE: 0.2 MG/DL

## 2024-07-02 ENCOUNTER — NON-APPOINTMENT (OUTPATIENT)
Age: 44
End: 2024-07-02

## 2024-07-16 ENCOUNTER — NON-APPOINTMENT (OUTPATIENT)
Age: 44
End: 2024-07-16

## 2024-09-25 ENCOUNTER — TRANSCRIPTION ENCOUNTER (OUTPATIENT)
Age: 44
End: 2024-09-25

## 2024-09-26 ENCOUNTER — TRANSCRIPTION ENCOUNTER (OUTPATIENT)
Age: 44
End: 2024-09-26

## 2024-10-01 ENCOUNTER — APPOINTMENT (OUTPATIENT)
Facility: CLINIC | Age: 44
End: 2024-10-01

## 2024-10-02 ENCOUNTER — TRANSCRIPTION ENCOUNTER (OUTPATIENT)
Age: 44
End: 2024-10-02

## 2024-10-02 LAB
25(OH)D3 SERPL-MCNC: 35.7 NG/ML
ALBUMIN SERPL ELPH-MCNC: 4.5 G/DL
ALP BLD-CCNC: 81 U/L
ALT SERPL-CCNC: 11 U/L
ANION GAP SERPL CALC-SCNC: 12 MMOL/L
AST SERPL-CCNC: 20 U/L
BASOPHILS # BLD AUTO: 0.03 K/UL
BASOPHILS NFR BLD AUTO: 0.5 %
BILIRUB SERPL-MCNC: 0.5 MG/DL
BUN SERPL-MCNC: 13 MG/DL
CALCIUM SERPL-MCNC: 9.5 MG/DL
CHLORIDE SERPL-SCNC: 104 MMOL/L
CHOLEST SERPL-MCNC: 190 MG/DL
CO2 SERPL-SCNC: 26 MMOL/L
CREAT SERPL-MCNC: 0.76 MG/DL
EGFR: 100 ML/MIN/1.73M2
EOSINOPHIL # BLD AUTO: 0.13 K/UL
EOSINOPHIL NFR BLD AUTO: 2 %
ESTIMATED AVERAGE GLUCOSE: 97 MG/DL
GLUCOSE SERPL-MCNC: 80 MG/DL
HBA1C MFR BLD HPLC: 5 %
HCT VFR BLD CALC: 42.6 %
HDLC SERPL-MCNC: 74 MG/DL
HGB BLD-MCNC: 14.2 G/DL
IMM GRANULOCYTES NFR BLD AUTO: 0.2 %
LDLC SERPL CALC-MCNC: 107 MG/DL
LYMPHOCYTES # BLD AUTO: 1.79 K/UL
LYMPHOCYTES NFR BLD AUTO: 27.4 %
MAN DIFF?: NORMAL
MCHC RBC-ENTMCNC: 29.4 PG
MCHC RBC-ENTMCNC: 33.3 GM/DL
MCV RBC AUTO: 88.2 FL
MONOCYTES # BLD AUTO: 0.44 K/UL
MONOCYTES NFR BLD AUTO: 6.7 %
NEUTROPHILS # BLD AUTO: 4.13 K/UL
NEUTROPHILS NFR BLD AUTO: 63.2 %
NONHDLC SERPL-MCNC: 115 MG/DL
PLATELET # BLD AUTO: 297 K/UL
POTASSIUM SERPL-SCNC: 4.8 MMOL/L
PROT SERPL-MCNC: 7.2 G/DL
RBC # BLD: 4.83 M/UL
RBC # FLD: 13.2 %
SODIUM SERPL-SCNC: 142 MMOL/L
T4 FREE SERPL-MCNC: 1.3 NG/DL
TRIGL SERPL-MCNC: 41 MG/DL
TSH SERPL-ACNC: 0.9 UIU/ML
VIT B12 SERPL-MCNC: >2000 PG/ML
WBC # FLD AUTO: 6.53 K/UL

## 2024-10-02 RX ORDER — METFORMIN HYDROCHLORIDE 500 MG/1
500 TABLET, COATED ORAL
Qty: 180 | Refills: 1 | Status: ACTIVE | COMMUNITY
Start: 2024-10-02 | End: 1900-01-01

## 2025-04-04 ENCOUNTER — TRANSCRIPTION ENCOUNTER (OUTPATIENT)
Age: 45
End: 2025-04-04

## 2025-05-02 ENCOUNTER — NON-APPOINTMENT (OUTPATIENT)
Age: 45
End: 2025-05-02

## 2025-05-02 ENCOUNTER — APPOINTMENT (OUTPATIENT)
Dept: INTERNAL MEDICINE | Facility: CLINIC | Age: 45
End: 2025-05-02

## 2025-05-02 VITALS
HEART RATE: 73 BPM | OXYGEN SATURATION: 99 % | TEMPERATURE: 99.3 F | WEIGHT: 174 LBS | SYSTOLIC BLOOD PRESSURE: 110 MMHG | HEIGHT: 65.75 IN | BODY MASS INDEX: 28.3 KG/M2 | DIASTOLIC BLOOD PRESSURE: 70 MMHG

## 2025-05-02 DIAGNOSIS — Z00.00 ENCOUNTER FOR GENERAL ADULT MEDICAL EXAMINATION W/OUT ABNORMAL FINDINGS: ICD-10-CM

## 2025-05-02 DIAGNOSIS — D21.4 BENIGN NEOPLASM OF CONNECTIVE AND OTHER SOFT TISSUE OF ABDOMEN: ICD-10-CM

## 2025-05-02 PROCEDURE — 99396 PREV VISIT EST AGE 40-64: CPT

## 2025-06-16 ENCOUNTER — TRANSCRIPTION ENCOUNTER (OUTPATIENT)
Age: 45
End: 2025-06-16